# Patient Record
Sex: FEMALE | Race: WHITE | NOT HISPANIC OR LATINO | Employment: OTHER | URBAN - METROPOLITAN AREA
[De-identification: names, ages, dates, MRNs, and addresses within clinical notes are randomized per-mention and may not be internally consistent; named-entity substitution may affect disease eponyms.]

---

## 2013-09-09 LAB — HM COLONOSCOPY: NORMAL

## 2016-02-12 LAB — HM MAMMOGRAM: NORMAL

## 2017-02-13 ENCOUNTER — TELEPHONE (OUTPATIENT)
Dept: FAMILY MEDICINE CLINIC | Facility: CLINIC | Age: 52
End: 2017-02-13

## 2017-02-13 NOTE — TELEPHONE ENCOUNTER
RESUBMITTED PA FOR AMITIZA AND WILL AWAIT PA APPROVAL/DENIAL. PT IS AWARE OF THIS.     ----- Message from Juliet Chua sent at 2/10/2017  3:09 PM EST -----  NEEDS A PA ON AMITIZA   SAYS SHE HAS HAD IBS FOR 20 YEARS     PLEASE CALL PT WITH QUESTIONS AND IF THIS GETS APPROVED OR NOT     SHE STATES THIS WORKS MUCH BETTER THAN ЮЛИЯ     [phone]    INS   B

## 2017-02-14 ENCOUNTER — TELEPHONE (OUTPATIENT)
Dept: FAMILY MEDICINE CLINIC | Facility: CLINIC | Age: 52
End: 2017-02-14

## 2017-02-14 RX ORDER — TRIAMCINOLONE ACETONIDE 0.25 MG/G
CREAM TOPICAL 2 TIMES DAILY
Qty: 15 G | Refills: 1 | Status: SHIPPED | OUTPATIENT
Start: 2017-02-14 | End: 2017-03-19 | Stop reason: SDUPTHER

## 2017-02-14 NOTE — TELEPHONE ENCOUNTER
SENT RX TO PHARMACY FOR PT. CALLED AND LEFT MESSAGE FOR PT THAT I SENT RX FOR HER.     ----- Message from Davidson Buenrostro MD sent at 2/14/2017  8:05 AM EST -----  Contact: PATIENT [phone]  Kenalog cream, 30 or 45 g I don't know how it comes, use on eczema twice daily when necessary  ----- Message -----     From: Valery Ramos MA     Sent: 2/13/2017  11:03 AM       To: MD DR. JOVON Mckay,  PT SAID THAT IN THE PAST SHE HAS TAKEN A CREAM FOR ECZEMA AND SHE IS HAVING IT AGAIN AND WAS WONDERING IF YOU COULD CALL A CREAM IN FOR HER? PHARMACY: [facility]. PLEASE ADVISE. THANK YOU.

## 2017-02-28 ENCOUNTER — OFFICE VISIT (OUTPATIENT)
Dept: FAMILY MEDICINE CLINIC | Facility: CLINIC | Age: 52
End: 2017-02-28

## 2017-02-28 VITALS
SYSTOLIC BLOOD PRESSURE: 112 MMHG | TEMPERATURE: 97.9 F | WEIGHT: 164.6 LBS | HEIGHT: 65 IN | OXYGEN SATURATION: 97 % | BODY MASS INDEX: 27.42 KG/M2 | HEART RATE: 62 BPM | DIASTOLIC BLOOD PRESSURE: 68 MMHG

## 2017-02-28 DIAGNOSIS — F41.1 GENERALIZED ANXIETY DISORDER: Primary | ICD-10-CM

## 2017-02-28 DIAGNOSIS — E03.9 ACQUIRED HYPOTHYROIDISM: ICD-10-CM

## 2017-02-28 PROCEDURE — 99213 OFFICE O/P EST LOW 20 MIN: CPT | Performed by: INTERNAL MEDICINE

## 2017-02-28 RX ORDER — ESTRADIOL 0.07 MG/D
FILM, EXTENDED RELEASE TRANSDERMAL
Refills: 1 | COMMUNITY
Start: 2017-02-11 | End: 2020-09-04

## 2017-02-28 NOTE — PROGRESS NOTES
Subjective   Zee Reno is a 51 y.o. female. Patient is here today for issues that may well be some ADD.  She says her mind is just jumping from one thing to another she has difficulty completing tasks and really focusing.  She thinks she is having worsening the ADD that she had when she was younger.  Her son has some ADD as well.  She has not been evaluated at all recently.  Also she is hypothyroid on replacement has not been checked in about 4 months and is due for that.     Chief Complaint   Patient presents with   • Ear Fullness   • Advice Only     wants to talk about add or adhd medications          Vitals:    02/28/17 1502   BP: 112/68   Pulse: 62   Temp: 97.9 °F (36.6 °C)   SpO2: 97%     The following portions of the patient's history were reviewed and updated as appropriate: allergies, current medications, past family history, past medical history, past social history, past surgical history and problem list.    History reviewed. No pertinent past medical history.   Allergies   Allergen Reactions   • Codeine       Social History     Social History   • Marital status:      Spouse name: N/A   • Number of children: N/A   • Years of education: N/A     Occupational History   • Not on file.     Social History Main Topics   • Smoking status: Never Smoker   • Smokeless tobacco: Never Used   • Alcohol use Yes      Comment: SOCIAL   • Drug use: No   • Sexual activity: Yes     Partners: Male     Other Topics Concern   • Not on file     Social History Narrative   • No narrative on file        Current Outpatient Prescriptions:   •  acyclovir (ZOVIRAX) 400 MG tablet, TAKE 1 BY MOUTH TWICE A DAY, Disp: , Rfl: 3  •  citalopram (CeleXA) 40 MG tablet, Take 1 tablet by mouth Daily., Disp: 30 tablet, Rfl: 5  •  estradiol (MINIVELLE, VIVELLE-DOT) 0.075 MG/24HR patch, FRANCHESKA 1 PATCH TOPICALLY TWICE PER WEEK, Disp: , Rfl: 1  •  lubiprostone (AMITIZA) 24 MCG capsule, Take 1 capsule by mouth Daily., Disp: 30 capsule, Rfl: 3  •   SYNTHROID 150 MCG tablet, Take 1 tablet by mouth Daily., Disp: 90 tablet, Rfl: 3  •  triamcinolone (KENALOG) 0.025 % cream, Apply  topically 2 (Two) Times a Day., Disp: 15 g, Rfl: 1     Objective     History of Present Illness     Review of Systems   Constitutional: Negative.    HENT: Negative.    Eyes: Negative.    Respiratory: Negative.    Cardiovascular: Negative.    Gastrointestinal: Negative.    Genitourinary: Negative.    Musculoskeletal: Negative.    Neurological: Negative.    Psychiatric/Behavioral: Negative.        Physical Exam   Constitutional: She appears well-developed and well-nourished.   HENT:   Head: Normocephalic and atraumatic.   Eyes: Conjunctivae are normal.   Neck: Normal range of motion. Neck supple.   Cardiovascular: Normal rate, regular rhythm and normal heart sounds.    Pulmonary/Chest: Effort normal and breath sounds normal. No respiratory distress. She has no wheezes. She has no rales.   Musculoskeletal: Normal range of motion. She exhibits no edema.   Neurological: She is alert.   Skin: Skin is warm and dry.   Psychiatric: She has a normal mood and affect. Her behavior is normal.   Nursing note and vitals reviewed.      ASSESSMENT  overall the patient seems stable.  She does have symptoms compatible with ADD but I'm not competent to make that diagnosis.  She's also due for thyroid reevaluation.     Problem List Items Addressed This Visit        Endocrine    Hypothyroidism    Relevant Orders    T4, Free    TSH       Other    Generalized anxiety disorder - Primary    Relevant Orders    Ambulatory Referral to Psychology          PLAN  the patient will get thyroid studies drawn today.  I referred her for psychologic evaluation for possible ADD either through her insurance company or with Dr. Alaniz's group and can see her back once that's been done.      There are no Patient Instructions on file for this visit.  No Follow-up on file.

## 2017-03-01 LAB
T4 FREE SERPL-MCNC: 1.27 NG/DL (ref 0.93–1.7)
TSH SERPL DL<=0.005 MIU/L-ACNC: 0.11 MIU/ML (ref 0.27–4.2)

## 2017-03-20 RX ORDER — TRIAMCINOLONE ACETONIDE 0.25 MG/G
CREAM TOPICAL
Qty: 15 G | Refills: 0 | Status: SHIPPED | OUTPATIENT
Start: 2017-03-20 | End: 2021-03-03

## 2017-03-30 ENCOUNTER — APPOINTMENT (OUTPATIENT)
Dept: WOMENS IMAGING | Facility: HOSPITAL | Age: 52
End: 2017-03-30

## 2017-03-30 LAB — HM PAP SMEAR: NORMAL

## 2017-03-30 PROCEDURE — 77063 BREAST TOMOSYNTHESIS BI: CPT | Performed by: RADIOLOGY

## 2017-03-30 PROCEDURE — 77067 SCR MAMMO BI INCL CAD: CPT | Performed by: RADIOLOGY

## 2017-04-28 ENCOUNTER — OFFICE VISIT (OUTPATIENT)
Dept: FAMILY MEDICINE CLINIC | Facility: CLINIC | Age: 52
End: 2017-04-28

## 2017-04-28 VITALS
OXYGEN SATURATION: 99 % | DIASTOLIC BLOOD PRESSURE: 72 MMHG | HEART RATE: 78 BPM | WEIGHT: 160 LBS | HEIGHT: 65 IN | RESPIRATION RATE: 16 BRPM | TEMPERATURE: 97.5 F | SYSTOLIC BLOOD PRESSURE: 138 MMHG | BODY MASS INDEX: 26.66 KG/M2

## 2017-04-28 DIAGNOSIS — H69.93 EUSTACHIAN TUBE DISORDER, BILATERAL: Primary | ICD-10-CM

## 2017-04-28 PROCEDURE — 99213 OFFICE O/P EST LOW 20 MIN: CPT | Performed by: NURSE PRACTITIONER

## 2017-04-28 RX ORDER — FLUTICASONE PROPIONATE 50 MCG
2 SPRAY, SUSPENSION (ML) NASAL DAILY
Qty: 1 EACH | Refills: 1 | Status: SHIPPED | OUTPATIENT
Start: 2017-04-28 | End: 2017-05-28

## 2017-04-28 RX ORDER — ESTRADIOL 0.1 MG/D
FILM, EXTENDED RELEASE TRANSDERMAL
Refills: 12 | COMMUNITY
Start: 2017-03-30 | End: 2020-09-04

## 2017-04-28 NOTE — PROGRESS NOTES
Subjective   Zee Reno is a 51 y.o. female.   Chief Complaint   Patient presents with   • Ear Fullness     both ears, been going on for a month, but gotten worse in the last week      Vitals:    04/28/17 1436   BP: 138/72   Pulse: 78   Resp: 16   Temp: 97.5 °F (36.4 °C)   SpO2: 99%     No LMP recorded. Patient has had a hysterectomy.    History of Present Illness  Zee is here for an acute visit. She c/o bilateral ear pressure and fullness of a month but worsened over the past week. She feels like her ears are clogged. She has tried using baby oil in her ear with little relief. She denies pain, drainage, vertigo.     The following portions of the patient's history were reviewed and updated as appropriate: allergies, current medications, past family history, past medical history, past social history, past surgical history and problem list.    Review of Systems   Constitutional: Negative for chills, fatigue and fever.   HENT: Negative for congestion, ear discharge, ear pain and tinnitus.    Respiratory: Negative.    Cardiovascular: Negative.    Allergic/Immunologic: Negative for environmental allergies.       Objective   Physical Exam   Constitutional: Vital signs are normal. She appears well-developed and well-nourished. No distress.   HENT:   Right Ear: External ear and ear canal normal. Tympanic membrane is not erythematous. A middle ear effusion is present.   Left Ear: External ear and ear canal normal. Tympanic membrane is bulging. Tympanic membrane is not erythematous. A middle ear effusion is present.   Nose: Nose normal.   Mouth/Throat: Oropharynx is clear and moist.   Cardiovascular: Normal rate.    Pulmonary/Chest: Effort normal and breath sounds normal.   Neurological: She is alert.       Assessment/Plan   Zee was seen today for ear fullness.    Diagnoses and all orders for this visit:    Eustachian tube disorder, bilateral    Other orders  -     fluticasone (FLONASE) 50 MCG/ACT nasal spray; 2  sprays into each nostril Daily for 30 days.      Can use flonase or otc sudafed  I gave her a handout on ETD  Follow up if your symptoms persist, worsen, or if you develop new symptoms

## 2017-04-28 NOTE — PATIENT INSTRUCTIONS
Eustachian Tube Dysfunction  The eustachian tube connects the middle ear to the back of the nose. It regulates air pressure in the middle ear by allowing air to move between the ear and nose. It also helps to drain fluid from the middle ear space. When the eustachian tube does not function properly, air pressure, fluid, or both can build up in the middle ear.  Eustachian tube dysfunction can affect one or both ears.  CAUSES  This condition happens when the eustachian tube becomes blocked or cannot open normally. This may result from:  · Ear infections.  · Colds and other upper respiratory infections.  · Allergies.  · Irritation, such as from cigarette smoke or acid from the stomach coming up into the esophagus (gastroesophageal reflux).  · Sudden changes in air pressure, such as from descending in an airplane.  · Abnormal growths in the nose or throat, such as nasal polyps, tumors, or enlarged tissue at the back of the throat (adenoids).  RISK FACTORS  This condition may be more likely to develop in people who smoke and people who are overweight. Eustachian tube dysfunction may also be more likely to develop in children, especially children who have:  · Certain birth defects of the mouth, such as cleft palate.  · Large tonsils and adenoids.  SYMPTOMS  Symptoms of this condition may include:  · A feeling of fullness in the ear.  · Ear pain.  · Clicking or popping noises in the ear.  · Ringing in the ear.  · Hearing loss.  · Loss of balance.  Symptoms may get worse when the air pressure around you changes, such as when you travel to an area of high elevation or fly on an airplane.   DIAGNOSIS  This condition may be diagnosed based on:  · Your symptoms.  · A physical exam of your ear, nose, and throat.  · Tests, such as those that measure:    The movement of your eardrum (tympanogram).    Your hearing (audiometry).  TREATMENT  Treatment depends on the cause and severity of your condition. If your symptoms are mild, you  "may be able to relieve your symptoms by moving air into (\"popping\") your ears. If you have symptoms of fluid in your ears, treatment may include:  · Decongestants.  · Antihistamines.  · Nasal sprays or ear drops that contain medicines that reduce swelling (steroids).  In some cases, you may need to have a procedure to drain the fluid in your eardrum (myringotomy). In this procedure, a small tube is placed in the eardrum to:  · Drain the fluid.  · Restore the air in the middle ear space.  HOME CARE INSTRUCTIONS  · Take over-the-counter and prescription medicines only as told by your health care provider.  · Use techniques to help pop your ears as recommended by your health care provider. These may include:    Chewing gum.    Yawning.    Frequent, forceful swallowing.    Closing your mouth, holding your nose closed, and gently blowing as if you are trying to blow air out of your nose.  · Do not do any of the following until your health care provider approves:    Travel to high altitudes.    Fly in airplanes.    Work in a pressurized cabin or room.    Scuba dive.  · Keep your ears dry. Dry your ears completely after showering or bathing.  · Do not smoke.  · Keep all follow-up visits as told by your health care provider. This is important.  SEEK MEDICAL CARE IF:  · Your symptoms do not go away after treatment.  · Your symptoms come back after treatment.  · You are unable to pop your ears.  · You have:    A fever.    Pain in your ear.    Pain in your head or neck.    Fluid draining from your ear.  · Your hearing suddenly changes.  · You become very dizzy.  · You lose your balance.     This information is not intended to replace advice given to you by your health care provider. Make sure you discuss any questions you have with your health care provider.     Document Released: 01/13/2017 Document Reviewed: 01/06/2016  Elsevier Interactive Patient Education ©2016 [employer]    "

## 2017-05-01 DIAGNOSIS — Z00.00 ROUTINE HEALTH MAINTENANCE: Primary | ICD-10-CM

## 2017-05-01 DIAGNOSIS — E03.9 HYPOTHYROIDISM, UNSPECIFIED TYPE: ICD-10-CM

## 2017-06-12 RX ORDER — CITALOPRAM 40 MG/1
TABLET ORAL
Qty: 30 TABLET | Refills: 0 | Status: SHIPPED | OUTPATIENT
Start: 2017-06-12 | End: 2017-06-27 | Stop reason: SDUPTHER

## 2017-06-20 ENCOUNTER — TELEPHONE (OUTPATIENT)
Dept: FAMILY MEDICINE CLINIC | Facility: CLINIC | Age: 52
End: 2017-06-20

## 2017-06-20 RX ORDER — CITALOPRAM 20 MG/1
TABLET ORAL
Qty: 90 TABLET | Refills: 0 | Status: SHIPPED | OUTPATIENT
Start: 2017-06-20 | End: 2017-06-22 | Stop reason: SDUPTHER

## 2017-06-20 NOTE — TELEPHONE ENCOUNTER
SENT RX'S TO PHARMACY FOR PT. SHE IS AWARE. PT STATES THAT SHE WILL PROBABLY KEEP DR. SIGALA AS HER DOCTOR AND COME BACK HERE FOR FOLLOW UPS AND LABS BUT SHE WILL LET US KNOW.     ----- Message from Davidson Hutchison MD sent at 6/20/2017  8:06 AM EDT -----  It as it is okay to do this        ----- Message -----     From: Valery Ramos MA     Sent: 6/20/2017   7:11 AM       To: MD DR. JOVON Mckay,  PLEASE SEE BELOW, JUST WANTED TO MAKE SURE THIS IS OKAY. THANK YOU.     ----- Message -----     From: Mee Hudson     Sent: 6/19/2017   1:47 PM       To: Valery Ramos MA    PT SAID DR HUTCHISON WOULD SEND A 6 MONTH REFILL ON SYNTHROID AND CELEXA UNTIL SHE FOULD A DR WHERE SHE MOVED TO.    PHARMACY [address]       [phone]      PT # [phone] PLEASE CALL HER BACK IF YOU HAVE QUESTIONS

## 2017-06-22 RX ORDER — CITALOPRAM 20 MG/1
TABLET ORAL
Qty: 90 TABLET | Refills: 0 | Status: SHIPPED | OUTPATIENT
Start: 2017-06-22 | End: 2017-06-27

## 2017-06-27 RX ORDER — CITALOPRAM 40 MG/1
40 TABLET ORAL DAILY
Qty: 90 TABLET | Refills: 1 | Status: SHIPPED | OUTPATIENT
Start: 2017-06-27 | End: 2017-11-29 | Stop reason: SDUPTHER

## 2017-06-27 RX ORDER — LEVOTHYROXINE SODIUM 150 MCG
150 TABLET ORAL DAILY
Qty: 90 TABLET | Refills: 1 | Status: SHIPPED | OUTPATIENT
Start: 2017-06-27 | End: 2017-11-29 | Stop reason: SDUPTHER

## 2017-09-20 DIAGNOSIS — F41.1 GENERALIZED ANXIETY DISORDER: ICD-10-CM

## 2017-09-20 DIAGNOSIS — E03.9 ACQUIRED HYPOTHYROIDISM: ICD-10-CM

## 2017-09-20 DIAGNOSIS — Z13.220 SCREENING FOR CHOLESTEROL LEVEL: Primary | ICD-10-CM

## 2017-11-29 ENCOUNTER — TELEPHONE (OUTPATIENT)
Dept: FAMILY MEDICINE CLINIC | Facility: CLINIC | Age: 52
End: 2017-11-29

## 2017-11-29 RX ORDER — LEVOTHYROXINE SODIUM 150 MCG
150 TABLET ORAL DAILY
Qty: 90 TABLET | Refills: 0 | Status: SHIPPED | OUTPATIENT
Start: 2017-11-29 | End: 2020-09-04 | Stop reason: SDUPTHER

## 2017-11-29 RX ORDER — CITALOPRAM 40 MG/1
40 TABLET ORAL DAILY
Qty: 90 TABLET | Refills: 0 | Status: SHIPPED | OUTPATIENT
Start: 2017-11-29 | End: 2020-09-04 | Stop reason: SDUPTHER

## 2017-11-29 NOTE — TELEPHONE ENCOUNTER
SENT RX'S TO PHARMACY FOR PT FOR A 90 DAY SUPPLY. CALLED AND LEFT MESSAGE FOR PT ADVISING HER THAT I SENT THEM IN AND THAT SHE ALSO IS DUE FOR AN APPT WITH LABS A WEEK PRIOR TO NEXT REFILL.       ----- Message from Amie Barlow MA sent at 11/28/2017  2:59 PM EST -----  Contact: PT  PT NEEDS RX REFILL FOR SYNTHROID 150 MCG QTY 90 AND CELEXA 40 MG QTY 90 PT USES [facility] [address] [place] [address] [id number] AT [facility] - [phone]  - [phone] FX        PT CAN BE REACHED [phone]

## 2018-02-19 RX ORDER — CITALOPRAM 40 MG/1
TABLET ORAL
Qty: 30 TABLET | Refills: 0 | Status: SHIPPED | OUTPATIENT
Start: 2018-02-19 | End: 2020-09-04

## 2018-05-14 RX ORDER — LEVOTHYROXINE SODIUM 0.15 MG/1
TABLET ORAL
Qty: 130 TABLET | Refills: 0 | OUTPATIENT
Start: 2018-05-14

## 2018-05-16 RX ORDER — TRIAMCINOLONE ACETONIDE 0.25 MG/G
CREAM TOPICAL
Qty: 15 G | Refills: 0 | OUTPATIENT
Start: 2018-05-16

## 2020-01-12 ENCOUNTER — HOSPITAL ENCOUNTER (EMERGENCY)
Dept: HOSPITAL 62 - ER | Age: 55
Discharge: HOME | End: 2020-01-12
Payer: COMMERCIAL

## 2020-01-12 VITALS — SYSTOLIC BLOOD PRESSURE: 112 MMHG | DIASTOLIC BLOOD PRESSURE: 83 MMHG

## 2020-01-12 DIAGNOSIS — M54.9: ICD-10-CM

## 2020-01-12 DIAGNOSIS — R10.11: ICD-10-CM

## 2020-01-12 DIAGNOSIS — K21.9: Primary | ICD-10-CM

## 2020-01-12 DIAGNOSIS — R07.89: ICD-10-CM

## 2020-01-12 LAB
ADD MANUAL DIFF: NO
ALBUMIN SERPL-MCNC: 4.6 G/DL (ref 3.5–5)
ALP SERPL-CCNC: 56 U/L (ref 38–126)
ANION GAP SERPL CALC-SCNC: 8 MMOL/L (ref 5–19)
AST SERPL-CCNC: 25 U/L (ref 14–36)
BASOPHILS # BLD AUTO: 0 10^3/UL (ref 0–0.2)
BASOPHILS NFR BLD AUTO: 0.4 % (ref 0–2)
BILIRUB DIRECT SERPL-MCNC: 0.1 MG/DL (ref 0–0.4)
BILIRUB SERPL-MCNC: 0.8 MG/DL (ref 0.2–1.3)
BUN SERPL-MCNC: 9 MG/DL (ref 7–20)
CALCIUM: 9.7 MG/DL (ref 8.4–10.2)
CHLORIDE SERPL-SCNC: 106 MMOL/L (ref 98–107)
CO2 SERPL-SCNC: 26 MMOL/L (ref 22–30)
EOSINOPHIL # BLD AUTO: 0.1 10^3/UL (ref 0–0.6)
EOSINOPHIL NFR BLD AUTO: 3.9 % (ref 0–6)
ERYTHROCYTE [DISTWIDTH] IN BLOOD BY AUTOMATED COUNT: 12.6 % (ref 11.5–14)
GLUCOSE SERPL-MCNC: 101 MG/DL (ref 75–110)
HCT VFR BLD CALC: 40.3 % (ref 36–47)
HGB BLD-MCNC: 14.2 G/DL (ref 12–15.5)
LYMPHOCYTES # BLD AUTO: 1.2 10^3/UL (ref 0.5–4.7)
LYMPHOCYTES NFR BLD AUTO: 40.6 % (ref 13–45)
MCH RBC QN AUTO: 31.5 PG (ref 27–33.4)
MCHC RBC AUTO-ENTMCNC: 35.2 G/DL (ref 32–36)
MCV RBC AUTO: 90 FL (ref 80–97)
MONOCYTES # BLD AUTO: 0.3 10^3/UL (ref 0.1–1.4)
MONOCYTES NFR BLD AUTO: 10.4 % (ref 3–13)
NEUTROPHILS # BLD AUTO: 1.4 10^3/UL (ref 1.7–8.2)
NEUTS SEG NFR BLD AUTO: 44.7 % (ref 42–78)
PLATELET # BLD: 230 10^3/UL (ref 150–450)
POTASSIUM SERPL-SCNC: 4.3 MMOL/L (ref 3.6–5)
PROT SERPL-MCNC: 7.7 G/DL (ref 6.3–8.2)
RBC # BLD AUTO: 4.51 10^6/UL (ref 3.72–5.28)
TOTAL CELLS COUNTED % (AUTO): 100 %
WBC # BLD AUTO: 3.1 10^3/UL (ref 4–10.5)

## 2020-01-12 PROCEDURE — 80053 COMPREHEN METABOLIC PANEL: CPT

## 2020-01-12 PROCEDURE — 36415 COLL VENOUS BLD VENIPUNCTURE: CPT

## 2020-01-12 PROCEDURE — 93005 ELECTROCARDIOGRAM TRACING: CPT

## 2020-01-12 PROCEDURE — 99285 EMERGENCY DEPT VISIT HI MDM: CPT

## 2020-01-12 PROCEDURE — 71046 X-RAY EXAM CHEST 2 VIEWS: CPT

## 2020-01-12 PROCEDURE — S0119 ONDANSETRON 4 MG: HCPCS

## 2020-01-12 PROCEDURE — 93010 ELECTROCARDIOGRAM REPORT: CPT

## 2020-01-12 PROCEDURE — 84484 ASSAY OF TROPONIN QUANT: CPT

## 2020-01-12 PROCEDURE — 83690 ASSAY OF LIPASE: CPT

## 2020-01-12 PROCEDURE — 76705 ECHO EXAM OF ABDOMEN: CPT

## 2020-01-12 PROCEDURE — 85025 COMPLETE CBC W/AUTO DIFF WBC: CPT

## 2020-01-12 NOTE — RADIOLOGY REPORT (SQ)
EXAM DESCRIPTION:  CHEST 2 VIEWS



COMPLETED DATE/TIME:  1/12/2020 10:29 am



REASON FOR STUDY:  chest pain



COMPARISON:  None.



TECHNIQUE:  Frontal and lateral radiographic views of the chest acquired.



NUMBER OF VIEWS:  Two view.



LIMITATIONS:  None.



FINDINGS:  LUNGS AND PLEURA: No opacities, masses or pneumothorax. No pleural effusion.

MEDIASTINUM AND HILAR STRUCTURES: No masses or contour abnormalities.

HEART AND VASCULAR STRUCTURES: Heart normal size.  No evidence for failure.

BONES: No acute findings.

HARDWARE: None in the chest.

OTHER: No other significant finding.



IMPRESSION:  NO SIGNIFICANT RADIOGRAPHIC FINDING IN THE CHEST.



TECHNICAL DOCUMENTATION:  JOB ID:  [id number]

 2011 [employer]- All Rights Reserved



Reading location - IP/workstation name: [email or web address]

## 2020-01-12 NOTE — ER DOCUMENT REPORT
ED Medical Screen (RME)





- General


Chief Complaint: Back Pain


Stated Complaint: CHEST PAIN


Time Seen by Provider: 01/12/20 10:07


Mode of Arrival: Ambulatory


Information source: Patient


Notes: 





54-year-old female presented to the ED with complaints of back pain that 

radiates straight through to her chest as well as epigastric pain with nausea.  

Patient reports the epigastric pain with nausea has been going on for quite some

time however the sharp stabbing pain in her back just started prior to arrival. 

She states that she felt like she might be having a heart attack or gallbladder 

issue.





Exam:


Tenderness in the epigastric region.





I have greeted and performed a rapid initial assessment of this patient.  A 

comprehensive ED assessment and evaluation of the patient, analysis of test resu

lts and completion of the medical decision making process will be conducted by 

additional ED providers.  I have specifically instructed the patient or family 

members with the patient to immediately return to any nursing staff should 

anything change in the patient's condition or with their chief complaint.





TRAVEL OUTSIDE OF THE [place] IN LAST 30 DAYS: No





- Related Data


Allergies/Adverse Reactions: 


                                        





No Known Allergies Allergy (Unverified 01/12/20 10:06)


   








Home Medications: Synthroid.  Celexa.  Cytomel.  Lunesta.  Estrogen pellets





Past Medical History





- Social History


Chew tobacco use (# tins/day): No


Frequency of alcohol use: Rare


Drug Abuse: None





Physical Exam





- Vital signs


Vitals: 





                                        











Temp Pulse Resp BP Pulse Ox


 


 97.8 F   80   20   116/57 L  97 


 


 01/12/20 09:46  01/12/20 09:46  01/12/20 09:46  01/12/20 09:46  01/12/20 09:46














Course





- Vital Signs


Vital signs: 





                                        











Temp Pulse Resp BP Pulse Ox


 


 97.8 F   80   20   116/57 L  97 


 


 01/12/20 09:46  01/12/20 09:46  01/12/20 09:46  01/12/20 09:46  01/12/20 09:46

## 2020-01-12 NOTE — ER DOCUMENT REPORT
Entered by ANNETTE MANZANARES SCRIBE  01/12/20 1117 





Acting as scribe for:ANGE MARQUEZ MD





ED General





- General


Chief Complaint: Back Pain


Stated Complaint: CHEST PAIN


Time Seen by Provider: 01/12/20 10:07


Mode of Arrival: Ambulatory


Information source: Patient


Notes: 





This 54 year old female patient presents to the emergency department today for 

complaints of a stabbing pain in her upper back and left chest just prior to 

arrival. Patient states she was here visiting her son and she was getting ready 

to drive back to [place] when the symptoms began. Patient reports that her chest

pain is reproducible with movement of her left upper extremity and with 

breathing. Patient states she feels like her chest is bruised. 





Patient continues on with multiple chronic complaints including not being able 

to eat any solid food for the last x2 years, stating she vomits it back up. 

Patient has been followed by a GI doctor for this although it seems she does not

follow any of their advice as her "father-in-law has had similar symptoms and he

knows what to do". 


TRAVEL OUTSIDE OF THE [place] IN LAST 30 DAYS: No





- Related Data


Allergies/Adverse Reactions: 


                                        





No Known Allergies Allergy (Unverified 01/12/20 10:06)


   








Home Medications: Synthroid.  Celexa.  Cytomel.  Lunesta.  Estrogen pellets





Past Medical History





- General


Information source: Patient





- Social History


Smoking Status: Never Smoker


Cigarette use (# per day): No


Chew tobacco use (# tins/day): No


Frequency of alcohol use: Rare


Drug Abuse: None


Lives with: Family


Family History: Reviewed & Not Pertinent


Patient has suicidal ideation: No


Patient has homicidal ideation: No


GI Medical History: Reports: Hx Gastroesophageal Reflux Disease, Hx Hiatal 

Hernia





Review of Systems





- Review of Systems


Constitutional: No symptoms reported


EENT: No symptoms reported


Cardiovascular: See HPI, Chest pain


Respiratory: See HPI, Hurts to breathe


Gastrointestinal: No symptoms reported


Genitourinary: No symptoms reported


Female Genitourinary: No symptoms reported


Musculoskeletal: See HPI, Other - LUE extremity pain


Skin: No symptoms reported


Hematologic/Lymphatic: Easy bruising


Neurological/Psychological: No symptoms reported


-: Yes All other systems reviewed and negative





Physical Exam





- Vital signs


Vitals: 


                                        











Temp Pulse Resp BP Pulse Ox


 


 97.8 F   80   20   116/57 L  97 


 


 01/12/20 09:46  01/12/20 09:46  01/12/20 09:46  01/12/20 09:46  01/12/20 09:46











Interpretation: Normal





- General


General appearance: Appears well, Alert





- HEENT


Head: Normocephalic, Atraumatic


Eyes: Normal


Pupils: PERRL





- Respiratory


Respiratory status: No respiratory distress


Chest status: Nontender


Breath sounds: Normal


Chest palpation: Normal





- Cardiovascular


Rhythm: Regular


Heart sounds: Normal auscultation


Murmur: No





- Abdominal


Distension: No distension


Bowel sounds: Normal


Tenderness: Tender - epigastric tenderness with palpation


Organomegaly: No organomegaly





- Back


Back: Normal, Nontender





- Extremities


General upper extremity: Other - bruising over the medial volar wrists 

bilaterally


General lower extremity: Edema - minimal bilateral trace pre-tibial edema 

bilaterally





- Neurological


Neuro grossly intact: Yes


Cognition: Normal


Orientation: AAOx4


Chichester Coma Scale Eye Opening: Spontaneous


Molly Coma Scale Verbal: Oriented


Molly Coma Scale Motor: Obeys Commands


Chichester Coma Scale Total: 15


Speech: Normal


Motor strength normal: LUE, RUE, LLE, RLE


Sensory: Normal





- Psychological


Associated symptoms: Other - Rapid speech, frequently changes subjects





- Skin


Skin Temperature: Warm


Skin Moisture: Dry


Skin Color: Normal





Course





- Re-evaluation


Re-evalutation: 





01/12/20 12:30


Patient reports the epigastric discomfort did improve after the GI cocktail.





- Vital Signs


Vital signs: 


                                        











Temp Pulse Resp BP Pulse Ox


 


 97.7 F   67   14   112/83   100 


 


 01/12/20 12:55  01/12/20 12:55  01/12/20 12:55  01/12/20 12:55  01/12/20 12:55














- Laboratory


Result Diagrams: 


                                 01/12/20 10:21





                                 01/12/20 10:21


Laboratory results interpreted by me: 


                                        











  01/12/20





  10:21


 


WBC  3.1 L


 


Absolute Neuts (auto)  1.4 L














- Diagnostic Test


Radiology reviewed: Image reviewed, Reports reviewed - Right upper quadrant 

abdominal ultrasound is unremarkable.  Chest x-ray is unremarkable.





- EKG Interpretation by Me


EKG shows normal: Sinus rhythm, Axis, Intervals, QRS Complexes.  abnormal: ST-T 

Waves - Borderline anterior lateral T abnormalities


Rate: Normal - 72


Rhythm: NSR


When compared to previous EKG there are: Previous EKG unavailable





Discharge





- Discharge


Clinical Impression: 


 Chest wall pain





GERD (gastroesophageal reflux disease)


Qualifiers:


 Esophagitis presence: esophagitis presence not specified Qualified Code(s): 

K21.9 - Gastro-esophageal reflux disease without esophagitis





Condition: Stable


Disposition: HOME, SELF-CARE


Additional Instructions: 


Chest Wall Pain:





   Your chest pain has been diagnosed as coming from the chest wall.  This is 

often caused by straining the muscles or joints in the chest during physical 

activity, direct trauma, coughing, or vigorous vomiting.  Persons with arthritis

are especially prone to this type of pain, due to inflammation of the cartilage 

joints near the breast bone.  Occasionally, no cause can be found.


   Rest from strenuous physical activity.  This kind of chest pain is usually 

made worse by movement of the chest.  Depending on the symptoms, we may 

prescribe medicine for pain, muscle relaxation, and antiinflammatory effects.


   If the pain is new, and seems to be due to muscle strain, cold packs can 

help. Otherwise, apply gentle warmth to the painful area for 15 minutes every 

hour or two.   


   You should contact the doctor immediately if things change.  Further 

evaluation is needed if you develop a fever or cough, if the nature of the pain 

changes, or if you become short of breath.





Reflux Disease (GERD):





     Gastro-Esophageal Reflux Disease (GERD) is caused by stomach acid refluxing

back up into the esophagus. The valve at the end of the esophagus may be weak. 

This is common in persons with a hiatal hernia. GERD symptoms can include 

indigestion, chest pain, heartburn, or food "sticking." Certain foods, alcohol, 

and aspirin can make GERD worse.


     Treatment depends on the severity. Usually, antacids or acid-suppressing 

medicines are used. When the esophagus is acutely inflamed, the physician will 

often prescribe membrane-protective drugs such as Carafate.  Some patients 

benefit from medication such as Reglan that tightens the valve at the top of the

stomach.


     Avoid those foods that bring on your symptoms. For many people, these foods

are coffee, chocolate, onions, garlic, and carbonated drinks. Don't use alcohol,

aspirin, caffeine, or tobacco. Don't eat late at night -- within 4 hours of 

bedtime. Don't over-eat. If necessary, elevate the head of your bed about 4 

inches so that stomach acid will not roll up into your esophagus.


     Call the doctor if you develop severe chest pain, inability to swallow 

fluids, fever, or worsening symptoms.








*******************************************************

**************************************





Take the proton pump inhibitor that you were prescribed in the past.


Take antacids between meals and at bedtime.


Follow-up with your primary care provider for further evaluation and or referral

for your swallowing difficulties.





RETURN TO THE EMERGENCY ROOM IF ANY NEW OR WORSENING SYMPTOMS.








Tammyibe Attestation: 





01/12/20 11:36


I personally performed the services described in the documentation, reviewed and

edited the documentation which was dictated to the scribe in my presence, and it

accurately records my words and actions.





I personally performed the services described in the documentation, reviewed and

edited the documentation which was dictated to the scribe in my presence, and it

accurately records my words and actions.

## 2020-01-12 NOTE — EKG REPORT
SEVERITY:- BORDERLINE ECG -

SINUS RHYTHM

BORDERLINE T ABNORMALITIES, ANT-LAT LEADS

:

Confirmed by: Fritz Ocasio 12-Jan-2020 17:34:48

## 2020-08-07 DIAGNOSIS — Z11.59 NEED FOR HEPATITIS C SCREENING TEST: ICD-10-CM

## 2020-08-07 DIAGNOSIS — Z13.220 SCREENING FOR CHOLESTEROL LEVEL: ICD-10-CM

## 2020-08-07 DIAGNOSIS — E03.9 ACQUIRED HYPOTHYROIDISM: ICD-10-CM

## 2020-08-13 ENCOUNTER — RESULTS ENCOUNTER (OUTPATIENT)
Dept: FAMILY MEDICINE CLINIC | Facility: CLINIC | Age: 55
End: 2020-08-13

## 2020-08-13 DIAGNOSIS — E03.9 ACQUIRED HYPOTHYROIDISM: ICD-10-CM

## 2020-08-13 DIAGNOSIS — Z13.220 SCREENING FOR CHOLESTEROL LEVEL: ICD-10-CM

## 2020-08-13 DIAGNOSIS — Z11.59 NEED FOR HEPATITIS C SCREENING TEST: ICD-10-CM

## 2020-08-28 LAB
ALBUMIN SERPL-MCNC: 4.8 G/DL (ref 3.5–5.2)
ALBUMIN/GLOB SERPL: 2.3 G/DL
ALP SERPL-CCNC: 75 U/L (ref 39–117)
ALT SERPL-CCNC: 43 U/L (ref 1–33)
AST SERPL-CCNC: 25 U/L (ref 1–32)
BASOPHILS # BLD AUTO: 0.02 10*3/MM3 (ref 0–0.2)
BASOPHILS NFR BLD AUTO: 0.4 % (ref 0–1.5)
BILIRUB SERPL-MCNC: 0.6 MG/DL (ref 0–1.2)
BUN SERPL-MCNC: 10 MG/DL (ref 6–20)
BUN/CREAT SERPL: 11.9 (ref 7–25)
CALCIUM SERPL-MCNC: 10 MG/DL (ref 8.6–10.5)
CHLORIDE SERPL-SCNC: 103 MMOL/L (ref 98–107)
CHOLEST SERPL-MCNC: 221 MG/DL (ref 0–200)
CO2 SERPL-SCNC: 27.3 MMOL/L (ref 22–29)
CREAT SERPL-MCNC: 0.84 MG/DL (ref 0.57–1)
EOSINOPHIL # BLD AUTO: 0.14 10*3/MM3 (ref 0–0.4)
EOSINOPHIL NFR BLD AUTO: 3.1 % (ref 0.3–6.2)
ERYTHROCYTE [DISTWIDTH] IN BLOOD BY AUTOMATED COUNT: 12 % (ref 12.3–15.4)
GLOBULIN SER CALC-MCNC: 2.1 GM/DL
GLUCOSE SERPL-MCNC: 95 MG/DL (ref 65–99)
HCT VFR BLD AUTO: 41.4 % (ref 34–46.6)
HCV AB S/CO SERPL IA: <0.1 S/CO RATIO (ref 0–0.9)
HCV AB SERPL QL IA: NORMAL
HDLC SERPL-MCNC: 47 MG/DL (ref 40–60)
HGB BLD-MCNC: 14.1 G/DL (ref 12–15.9)
IMM GRANULOCYTES # BLD AUTO: 0.01 10*3/MM3 (ref 0–0.05)
IMM GRANULOCYTES NFR BLD AUTO: 0.2 % (ref 0–0.5)
LDLC SERPL CALC-MCNC: 138 MG/DL (ref 0–100)
LDLC/HDLC SERPL: 2.93 {RATIO}
LYMPHOCYTES # BLD AUTO: 1.73 10*3/MM3 (ref 0.7–3.1)
LYMPHOCYTES NFR BLD AUTO: 37.9 % (ref 19.6–45.3)
MCH RBC QN AUTO: 30.9 PG (ref 26.6–33)
MCHC RBC AUTO-ENTMCNC: 34.1 G/DL (ref 31.5–35.7)
MCV RBC AUTO: 90.6 FL (ref 79–97)
MONOCYTES # BLD AUTO: 0.4 10*3/MM3 (ref 0.1–0.9)
MONOCYTES NFR BLD AUTO: 8.8 % (ref 5–12)
NEUTROPHILS # BLD AUTO: 2.26 10*3/MM3 (ref 1.7–7)
NEUTROPHILS NFR BLD AUTO: 49.6 % (ref 42.7–76)
NRBC BLD AUTO-RTO: 0 /100 WBC (ref 0–0.2)
PLATELET # BLD AUTO: 258 10*3/MM3 (ref 140–450)
POTASSIUM SERPL-SCNC: 4.4 MMOL/L (ref 3.5–5.2)
PROT SERPL-MCNC: 6.9 G/DL (ref 6–8.5)
RBC # BLD AUTO: 4.57 10*6/MM3 (ref 3.77–5.28)
SODIUM SERPL-SCNC: 139 MMOL/L (ref 136–145)
T4 FREE SERPL-MCNC: 1.67 NG/DL (ref 0.93–1.7)
TRIGL SERPL-MCNC: 182 MG/DL (ref 0–150)
TSH SERPL DL<=0.005 MIU/L-ACNC: 0.01 UIU/ML (ref 0.27–4.2)
VLDLC SERPL CALC-MCNC: 36.4 MG/DL
WBC # BLD AUTO: 4.56 10*3/MM3 (ref 3.4–10.8)

## 2020-09-04 ENCOUNTER — OFFICE VISIT (OUTPATIENT)
Dept: FAMILY MEDICINE CLINIC | Facility: CLINIC | Age: 55
End: 2020-09-04

## 2020-09-04 VITALS
RESPIRATION RATE: 16 BRPM | WEIGHT: 173.2 LBS | SYSTOLIC BLOOD PRESSURE: 115 MMHG | HEIGHT: 64 IN | BODY MASS INDEX: 29.57 KG/M2 | OXYGEN SATURATION: 99 % | TEMPERATURE: 98.2 F | DIASTOLIC BLOOD PRESSURE: 74 MMHG | HEART RATE: 75 BPM

## 2020-09-04 DIAGNOSIS — F41.1 GENERALIZED ANXIETY DISORDER: ICD-10-CM

## 2020-09-04 DIAGNOSIS — F51.01 PRIMARY INSOMNIA: ICD-10-CM

## 2020-09-04 DIAGNOSIS — E03.9 ACQUIRED HYPOTHYROIDISM: Primary | ICD-10-CM

## 2020-09-04 DIAGNOSIS — E78.5 HYPERLIPIDEMIA, UNSPECIFIED HYPERLIPIDEMIA TYPE: ICD-10-CM

## 2020-09-04 DIAGNOSIS — F41.9 ANXIETY: ICD-10-CM

## 2020-09-04 DIAGNOSIS — Z23 NEED FOR VACCINATION: ICD-10-CM

## 2020-09-04 PROCEDURE — 90471 IMMUNIZATION ADMIN: CPT | Performed by: INTERNAL MEDICINE

## 2020-09-04 PROCEDURE — 90715 TDAP VACCINE 7 YRS/> IM: CPT | Performed by: INTERNAL MEDICINE

## 2020-09-04 PROCEDURE — 99214 OFFICE O/P EST MOD 30 MIN: CPT | Performed by: INTERNAL MEDICINE

## 2020-09-04 RX ORDER — ROSUVASTATIN CALCIUM 5 MG/1
5 TABLET, COATED ORAL DAILY
Qty: 90 TABLET | Refills: 3 | Status: SHIPPED | OUTPATIENT
Start: 2020-09-04 | End: 2021-06-18 | Stop reason: SDUPTHER

## 2020-09-04 RX ORDER — ESZOPICLONE 3 MG/1
3 TABLET, FILM COATED ORAL NIGHTLY
Qty: 30 TABLET | Refills: 3 | Status: SHIPPED | OUTPATIENT
Start: 2020-09-04 | End: 2021-01-27

## 2020-09-04 RX ORDER — DOXYCYCLINE HYCLATE 100 MG
100 TABLET ORAL 2 TIMES DAILY
COMMUNITY
End: 2021-03-03

## 2020-09-04 RX ORDER — CITALOPRAM 20 MG/1
20 TABLET ORAL DAILY
Qty: 90 TABLET | Refills: 3 | Status: SHIPPED | OUTPATIENT
Start: 2020-09-04 | End: 2021-07-29 | Stop reason: SDUPTHER

## 2020-09-04 RX ORDER — LEVOTHYROXINE SODIUM 150 MCG
150 TABLET ORAL DAILY
Qty: 90 TABLET | Refills: 3 | Status: SHIPPED | OUTPATIENT
Start: 2020-09-04 | End: 2021-10-07 | Stop reason: SDUPTHER

## 2020-09-04 NOTE — PROGRESS NOTES
Javier Reno is a 55 y.o. female. Patient is here today for establishing care again.  She was last seen by me over 3 years ago, had moved to [place] and now is back.  She is on a new sleeping pill and continues on thyroid medication.  She will be establishing with gynecology but is up-to-date there.  She has had recent laboratory studies to review.  She generally is feeling well.  Chief Complaint   Patient presents with   • Hypothyroidism     F/U on labs          Vitals:    09/04/20 1359   BP: 115/74   Pulse: 75   Resp: 16   Temp: 98.2 °F (36.8 °C)   SpO2: 99%     Body mass index is 29.73 kg/m².  The following portions of the patient's history were reviewed and updated as appropriate: allergies, current medications, past family history, past medical history, past social history, past surgical history and problem list.    Past Medical History:   Diagnosis Date   • Abdominal pain 07/08/2002    NEGATIVE ABDOMINAL SERIES, SEEN AT [facility] ER   • Adenomyosis    • Adjustment disorder with mixed anxiety and depressed mood    • Allergic rhinitis    • Anxiety    • Colon polyps    • Depression    • Dermatitis 12/10/2014   • Disease of thyroid gland     HYPOTHYROIDISM   • Dyspareunia, female    • Erosive esophagitis 01/16/2003    DR. DEBBIE RENTERIAD   • Eustachian tube disorder     BILATERAL   • Gastritis 01/16/2003    MILD, DR. LEWIS EGSABA   • Generalized anxiety disorder    • Genital herpes simplex    • History of menorrhagia    • Irritable bowel syndrome with constipation    • Left leg pain 11/08/2007    MRI OF LUMBAR SPINE AT [facility] WAS WNL   • Melanosis coli 01/16/2003    DR. CLAUDINE LEWIS CY   • Menopausal state    • Migraine    • Ovarian cyst 03/07/2002    CT SCAN AT [facility] FOR 3CM LEFT OVARIAN CYST   • Right sided abdominal pain 04/02/2002    CT SCAN SHOWED TRACE AMT OF FREE FLUID IN RIGHT HEMIPELVIS, UTERUS AND ADNEXA APPEAR UNREMARKABLE, TINY FOLLICLES WITHIN THE RIGHT OVARY   • Sliding hiatal hernia 01/16/2003     EGD        Allergies   Allergen Reactions   • Codeine    • Keflex [Cephalexin]       Social History     Socioeconomic History   • Marital status:      Spouse name: Not on file   • Number of children: Not on file   • Years of education: Not on file   • Highest education level: Not on file   Tobacco Use   • Smoking status: Never Smoker   • Smokeless tobacco: Never Used   Substance and Sexual Activity   • Alcohol use: Yes     Comment: SOCIAL   • Drug use: No   • Sexual activity: Yes     Partners: Male        Current Outpatient Medications:   •  acyclovir (ZOVIRAX) 400 MG tablet, TAKE 1 BY MOUTH TWICE A DAY, Disp: , Rfl: 3  •  citalopram (CeleXA) 20 MG tablet, Take 1 tablet by mouth Daily., Disp: 90 tablet, Rfl: 3  •  doxycycline (VIBRAMYICN) 100 MG tablet, Take 100 mg by mouth 2 (Two) Times a Day., Disp: , Rfl:   •  linaclotide (Linzess) 72 MCG capsule capsule, Take 72 mcg by mouth Every Morning Before Breakfast., Disp: , Rfl:   •  SYNTHROID 150 MCG tablet, Take 1 tablet by mouth Daily., Disp: 90 tablet, Rfl: 3  •  triamcinolone (KENALOG) 0.025 % cream, APPLY EXTERNALLY TO THE AFFECTED AREA TWICE DAILY, Disp: 15 g, Rfl: 0  •  eszopiclone (Lunesta) 3 MG tablet, Take 1 tablet by mouth Every Night. Take immediately before bedtime, Disp: 30 tablet, Rfl: 3  •  rosuvastatin (Crestor) 5 MG tablet, Take 1 tablet by mouth Daily., Disp: 90 tablet, Rfl: 3     Objective     History of Present Illness     Review of Systems   Constitutional: Negative.    HENT: Negative.    Respiratory: Negative.    Cardiovascular: Negative.    Gastrointestinal: Negative.    Genitourinary: Negative.    Musculoskeletal: Negative.    Skin: Negative.    Neurological: Negative.    Psychiatric/Behavioral: Negative.        Physical Exam   Constitutional: She is oriented to person, place, and time. She appears well-developed and well-nourished.   Pleasant, cooperative no acute distress, 120/80   HENT:   Head: Normocephalic and atraumatic.   Eyes:  Conjunctivae are normal. No scleral icterus.   Neck: Normal range of motion. Neck supple.   Cardiovascular: Normal rate, regular rhythm and normal heart sounds.   Pulmonary/Chest: Effort normal and breath sounds normal. No respiratory distress. She has no wheezes. She has no rales.   Musculoskeletal: Normal range of motion. She exhibits no edema.   Neurological: She is alert and oriented to person, place, and time.   Skin: Skin is warm and dry.   Psychiatric: She has a normal mood and affect. Her behavior is normal.   Nursing note and vitals reviewed.      ASSESSMENT CBC is normal.  CMP was essentially normal aside from minimally elevated ALT of 43.  TSH is low but free T4 is normal and clinically the patient's euthyroid.  Hepatitis C screen was negative.  Lipid panel is elevated with total cholesterol 221, HDL 47   #1-hyperlipidemia  #2-hypothyroidism, euthyroid on replacement  #3-insomnia  #4-anxiety and depression, controlled on Celexa     Problem List Items Addressed This Visit        Cardiovascular and Mediastinum    Hyperlipidemia    Relevant Medications    rosuvastatin (Crestor) 5 MG tablet       Endocrine    Hypothyroidism - Primary    Relevant Medications    SYNTHROID 150 MCG tablet       Other    Generalized anxiety disorder    Relevant Medications    citalopram (CeleXA) 20 MG tablet    eszopiclone (Lunesta) 3 MG tablet    Anxiety    Relevant Medications    eszopiclone (Lunesta) 3 MG tablet          PLAN the patient received a Tdap immunization today.  I am starting her on rosuvastatin 5 mg daily.  She will continue other medications and I will recheck her in 5 to 6 weeks with a lipid panel, CMP    There are no Patient Instructions on file for this visit.  Return in about 5 weeks (around 10/9/2020) for with labs.

## 2020-09-29 DIAGNOSIS — E78.5 HYPERLIPIDEMIA, UNSPECIFIED HYPERLIPIDEMIA TYPE: Primary | ICD-10-CM

## 2020-09-30 ENCOUNTER — OFFICE VISIT (OUTPATIENT)
Dept: FAMILY MEDICINE CLINIC | Facility: CLINIC | Age: 55
End: 2020-09-30

## 2020-09-30 DIAGNOSIS — E78.5 HYPERLIPIDEMIA, UNSPECIFIED HYPERLIPIDEMIA TYPE: ICD-10-CM

## 2020-09-30 DIAGNOSIS — R10.9 RIGHT SIDED ABDOMINAL PAIN: Primary | ICD-10-CM

## 2020-09-30 PROCEDURE — 99442 PR PHYS/QHP TELEPHONE EVALUATION 11-20 MIN: CPT | Performed by: INTERNAL MEDICINE

## 2020-09-30 NOTE — PROGRESS NOTES
Javier Reno is a 55 y.o. female. Patient is here today for a telephone visit due to the COVID-19 pandemic.  She tells me she has had some abdominal pain intermittently off and on for years.  Is gradually been getting worse.  Does seem at times to be associated with eating and primarily hits her in the right upper quadrant and is associated with some nausea and occasional vomiting.  She has apparently had abdominal ultrasound and HIDA scan done in the past that were negative.  She denies any fevers or chills.     No chief complaint on file.         There were no vitals filed for this visit.  There is no height or weight on file to calculate BMI.  The following portions of the patient's history were reviewed and updated as appropriate: allergies, current medications, past family history, past medical history, past social history, past surgical history and problem list.    Past Medical History:   Diagnosis Date   • Abdominal pain 07/08/2002    NEGATIVE ABDOMINAL SERIES, SEEN AT [facility] ER   • Adenomyosis    • Adjustment disorder with mixed anxiety and depressed mood    • Allergic rhinitis    • Anxiety    • Colon polyps    • Depression    • Dermatitis 12/10/2014   • Disease of thyroid gland     HYPOTHYROIDISM   • Dyspareunia, female    • Erosive esophagitis 01/16/2003    DR. LEWIS EGD   • Eustachian tube disorder     BILATERAL   • Gastritis 01/16/2003    MILD, DR. LEWIS EGD   • Generalized anxiety disorder    • Genital herpes simplex    • History of menorrhagia    • Irritable bowel syndrome with constipation    • Left leg pain 11/08/2007    MRI OF LUMBAR SPINE AT [facility] WAS WNL   • Melanosis coli 01/16/2003    DR. CLAUDINE LEWIS CY   • Menopausal state    • Migraine    • Ovarian cyst 03/07/2002    CT SCAN AT [facility] FOR 3CM LEFT OVARIAN CYST   • Right sided abdominal pain 04/02/2002    CT SCAN SHOWED TRACE AMT OF FREE FLUID IN RIGHT HEMIPELVIS, UTERUS AND ADNEXA APPEAR UNREMARKABLE, TINY FOLLICLES WITHIN THE RIGHT OVARY    • Sliding hiatal hernia 01/16/2003     EGD       Allergies   Allergen Reactions   • Codeine    • Keflex [Cephalexin]       Social History     Socioeconomic History   • Marital status:      Spouse name: Not on file   • Number of children: Not on file   • Years of education: Not on file   • Highest education level: Not on file   Tobacco Use   • Smoking status: Never Smoker   • Smokeless tobacco: Never Used   Substance and Sexual Activity   • Alcohol use: Yes     Comment: SOCIAL   • Drug use: No   • Sexual activity: Yes     Partners: Male        Current Outpatient Medications:   •  acyclovir (ZOVIRAX) 400 MG tablet, TAKE 1 BY MOUTH TWICE A DAY, Disp: , Rfl: 3  •  citalopram (CeleXA) 20 MG tablet, Take 1 tablet by mouth Daily., Disp: 90 tablet, Rfl: 3  •  doxycycline (VIBRAMYICN) 100 MG tablet, Take 100 mg by mouth 2 (Two) Times a Day., Disp: , Rfl:   •  eszopiclone (Lunesta) 3 MG tablet, Take 1 tablet by mouth Every Night. Take immediately before bedtime, Disp: 30 tablet, Rfl: 3  •  linaclotide (Linzess) 72 MCG capsule capsule, Take 72 mcg by mouth Every Morning Before Breakfast., Disp: , Rfl:   •  rosuvastatin (Crestor) 5 MG tablet, Take 1 tablet by mouth Daily., Disp: 90 tablet, Rfl: 3  •  SYNTHROID 150 MCG tablet, Take 1 tablet by mouth Daily., Disp: 90 tablet, Rfl: 3  •  triamcinolone (KENALOG) 0.025 % cream, APPLY EXTERNALLY TO THE AFFECTED AREA TWICE DAILY, Disp: 15 g, Rfl: 0     Objective     History of Present Illness     Review of Systems   Constitutional: Negative.    HENT: Negative.    Respiratory: Negative.    Cardiovascular: Negative.    Gastrointestinal: Positive for abdominal pain, nausea and vomiting.        Pain is in the right upper quadrant generally   Genitourinary: Negative.    Musculoskeletal: Negative.    Skin: Negative.    Neurological: Negative.    Psychiatric/Behavioral: Negative.        Physical Exam    ASSESSMENT patient had laboratory studies done August 27 with a normal CMP  aside from a minimally elevated ALT of 43.  #1-longstanding abdominal pain in the right upper quadrant, gradually worsening and certainly suggestive of gallbladder disease     Problem List Items Addressed This Visit        Nervous and Auditory    Right sided abdominal pain - Primary          PLAN I want the patient to get an abdominal ultrasound of the gallbladder and a CBC, CMP, amylase and lipase and then follow-up with me.  This was a telephone visit due to the COVID-19 pandemic, total time spent 22 minutes    There are no Patient Instructions on file for this visit.  No follow-ups on file.

## 2020-10-05 ENCOUNTER — LAB (OUTPATIENT)
Dept: LAB | Facility: HOSPITAL | Age: 55
End: 2020-10-05

## 2020-10-05 ENCOUNTER — HOSPITAL ENCOUNTER (OUTPATIENT)
Dept: ULTRASOUND IMAGING | Facility: HOSPITAL | Age: 55
Discharge: HOME | End: 2020-10-05

## 2020-10-05 ENCOUNTER — RESULTS ENCOUNTER (OUTPATIENT)
Dept: FAMILY MEDICINE CLINIC | Facility: CLINIC | Age: 55
End: 2020-10-05

## 2020-10-05 DIAGNOSIS — R10.9 RIGHT SIDED ABDOMINAL PAIN: ICD-10-CM

## 2020-10-05 LAB
ALBUMIN SERPL-MCNC: 4.2 G/DL (ref 3.5–5.2)
ALBUMIN/GLOB SERPL: 1.6 G/DL
ALP SERPL-CCNC: 73 U/L (ref 39–117)
ALT SERPL W P-5'-P-CCNC: 23 U/L (ref 1–33)
AMYLASE SERPL-CCNC: 36 U/L (ref 28–100)
ANION GAP SERPL CALCULATED.3IONS-SCNC: 7.2 MMOL/L (ref 5–15)
AST SERPL-CCNC: 13 U/L (ref 1–32)
BASOPHILS # BLD AUTO: 0.02 10*3/MM3 (ref 0–0.2)
BASOPHILS NFR BLD AUTO: 0.7 % (ref 0–1.5)
BILIRUB SERPL-MCNC: 0.5 MG/DL (ref 0–1.2)
BUN SERPL-MCNC: 8 MG/DL (ref 6–20)
BUN/CREAT SERPL: 10.1 (ref 7–25)
CALCIUM SPEC-SCNC: 9.3 MG/DL (ref 8.6–10.5)
CHLORIDE SERPL-SCNC: 108 MMOL/L (ref 98–107)
CHOLEST SERPL-MCNC: 179 MG/DL (ref 0–200)
CO2 SERPL-SCNC: 24.8 MMOL/L (ref 22–29)
CREAT SERPL-MCNC: 0.79 MG/DL (ref 0.57–1)
DEPRECATED RDW RBC AUTO: 40.3 FL (ref 37–54)
EOSINOPHIL # BLD AUTO: 0.1 10*3/MM3 (ref 0–0.4)
EOSINOPHIL NFR BLD AUTO: 3.3 % (ref 0.3–6.2)
ERYTHROCYTE [DISTWIDTH] IN BLOOD BY AUTOMATED COUNT: 12.3 % (ref 12.3–15.4)
GFR SERPL CREATININE-BSD FRML MDRD: 76 ML/MIN/1.73
GLOBULIN UR ELPH-MCNC: 2.7 GM/DL
GLUCOSE SERPL-MCNC: 97 MG/DL (ref 65–99)
HCT VFR BLD AUTO: 38.7 % (ref 34–46.6)
HDLC SERPL-MCNC: 46 MG/DL (ref 40–60)
HGB BLD-MCNC: 13.1 G/DL (ref 12–15.9)
IMM GRANULOCYTES # BLD AUTO: 0 10*3/MM3 (ref 0–0.05)
IMM GRANULOCYTES NFR BLD AUTO: 0 % (ref 0–0.5)
LDLC SERPL CALC-MCNC: 115 MG/DL (ref 0–100)
LDLC/HDLC SERPL: 2.5 {RATIO}
LIPASE SERPL-CCNC: 21 U/L (ref 13–60)
LYMPHOCYTES # BLD AUTO: 1.12 10*3/MM3 (ref 0.7–3.1)
LYMPHOCYTES NFR BLD AUTO: 36.7 % (ref 19.6–45.3)
MCH RBC QN AUTO: 30.5 PG (ref 26.6–33)
MCHC RBC AUTO-ENTMCNC: 33.9 G/DL (ref 31.5–35.7)
MCV RBC AUTO: 90.2 FL (ref 79–97)
MONOCYTES # BLD AUTO: 0.35 10*3/MM3 (ref 0.1–0.9)
MONOCYTES NFR BLD AUTO: 11.5 % (ref 5–12)
NEUTROPHILS NFR BLD AUTO: 1.46 10*3/MM3 (ref 1.7–7)
NEUTROPHILS NFR BLD AUTO: 47.8 % (ref 42.7–76)
NRBC BLD AUTO-RTO: 0 /100 WBC (ref 0–0.2)
PLATELET # BLD AUTO: 204 10*3/MM3 (ref 140–450)
PMV BLD AUTO: 9.7 FL (ref 6–12)
POTASSIUM SERPL-SCNC: 4.3 MMOL/L (ref 3.5–5.2)
PROT SERPL-MCNC: 6.9 G/DL (ref 6–8.5)
RBC # BLD AUTO: 4.29 10*6/MM3 (ref 3.77–5.28)
SODIUM SERPL-SCNC: 140 MMOL/L (ref 136–145)
TRIGL SERPL-MCNC: 90 MG/DL (ref 0–150)
VLDLC SERPL-MCNC: 18 MG/DL (ref 5–40)
WBC # BLD AUTO: 3.05 10*3/MM3 (ref 3.4–10.8)

## 2020-10-05 PROCEDURE — 80053 COMPREHEN METABOLIC PANEL: CPT | Performed by: INTERNAL MEDICINE

## 2020-10-05 PROCEDURE — 76705 ECHO EXAM OF ABDOMEN: CPT

## 2020-10-05 PROCEDURE — 36415 COLL VENOUS BLD VENIPUNCTURE: CPT | Performed by: INTERNAL MEDICINE

## 2020-10-05 PROCEDURE — 83690 ASSAY OF LIPASE: CPT | Performed by: INTERNAL MEDICINE

## 2020-10-05 PROCEDURE — 82150 ASSAY OF AMYLASE: CPT | Performed by: INTERNAL MEDICINE

## 2020-10-05 PROCEDURE — 80061 LIPID PANEL: CPT | Performed by: INTERNAL MEDICINE

## 2020-10-05 PROCEDURE — 85025 COMPLETE CBC W/AUTO DIFF WBC: CPT | Performed by: INTERNAL MEDICINE

## 2020-10-13 ENCOUNTER — OFFICE VISIT (OUTPATIENT)
Dept: FAMILY MEDICINE CLINIC | Facility: CLINIC | Age: 55
End: 2020-10-13

## 2020-10-13 VITALS
OXYGEN SATURATION: 100 % | HEART RATE: 69 BPM | TEMPERATURE: 97.7 F | HEIGHT: 64 IN | WEIGHT: 172.4 LBS | RESPIRATION RATE: 16 BRPM | BODY MASS INDEX: 29.43 KG/M2 | SYSTOLIC BLOOD PRESSURE: 118 MMHG | DIASTOLIC BLOOD PRESSURE: 76 MMHG

## 2020-10-13 DIAGNOSIS — E78.5 HYPERLIPIDEMIA, UNSPECIFIED HYPERLIPIDEMIA TYPE: Primary | ICD-10-CM

## 2020-10-13 DIAGNOSIS — K21.9 GASTROESOPHAGEAL REFLUX DISEASE, UNSPECIFIED WHETHER ESOPHAGITIS PRESENT: ICD-10-CM

## 2020-10-13 DIAGNOSIS — E03.9 ACQUIRED HYPOTHYROIDISM: ICD-10-CM

## 2020-10-13 DIAGNOSIS — K58.1 IRRITABLE BOWEL SYNDROME WITH CONSTIPATION: ICD-10-CM

## 2020-10-13 DIAGNOSIS — R10.9 RIGHT SIDED ABDOMINAL PAIN: ICD-10-CM

## 2020-10-13 PROCEDURE — 99214 OFFICE O/P EST MOD 30 MIN: CPT | Performed by: INTERNAL MEDICINE

## 2020-10-13 PROCEDURE — 90471 IMMUNIZATION ADMIN: CPT | Performed by: INTERNAL MEDICINE

## 2020-10-13 PROCEDURE — 90686 IIV4 VACC NO PRSV 0.5 ML IM: CPT | Performed by: INTERNAL MEDICINE

## 2020-10-13 NOTE — PROGRESS NOTES
Javier Reno is a 55 y.o. female. Patient is here today for follow-up on her right upper quadrant abdominal pain, history of irritable bowel syndrome and history of hiatal hernia and GERD.  Since I saw her last she had laboratory studies done as well as an ultrasound and is here to review the results.  She continues with frequent GERD symptoms as well as intermittent right upper quadrant abdominal pain.  Chief Complaint   Patient presents with   • Hyperlipidemia     Lab Results   • Hypothyroidism   • Rt Sided Abdominal Pain     Results of US Of GB          Vitals:    10/13/20 1452   BP: 118/76   Pulse: 69   Resp: 16   Temp: 97.7 °F (36.5 °C)   SpO2: 100%     Body mass index is 29.59 kg/m².  The following portions of the patient's history were reviewed and updated as appropriate: allergies, current medications, past family history, past medical history, past social history, past surgical history and problem list.    Past Medical History:   Diagnosis Date   • Abdominal pain 07/08/2002    NEGATIVE ABDOMINAL SERIES, SEEN AT [facility] ER   • Adenomyosis    • Adjustment disorder with mixed anxiety and depressed mood    • Allergic rhinitis    • Anxiety    • Colon polyps    • Depression    • Dermatitis 12/10/2014   • Disease of thyroid gland     HYPOTHYROIDISM   • Dyspareunia, female    • Erosive esophagitis 01/16/2003    DR. LEWIS EGD   • Eustachian tube disorder     BILATERAL   • Gastritis 01/16/2003    MILD, DR. LEWIS EGD   • Generalized anxiety disorder    • Genital herpes simplex    • History of menorrhagia    • Irritable bowel syndrome with constipation    • Left leg pain 11/08/2007    MRI OF LUMBAR SPINE AT [facility] WAS WNL   • Melanosis coli 01/16/2003    DR. CLAUDNIE LEWIS CY   • Menopausal state    • Migraine    • Ovarian cyst 03/07/2002    CT SCAN AT [facility] FOR 3CM LEFT OVARIAN CYST   • Right sided abdominal pain 04/02/2002    CT SCAN SHOWED TRACE AMT OF FREE FLUID IN RIGHT HEMIPELVIS, UTERUS AND ADNEXA APPEAR  UNREMARKABLE, TINY FOLLICLES WITHIN THE RIGHT OVARY   • Sliding hiatal hernia 01/16/2003     EGSABA       Allergies   Allergen Reactions   • Codeine    • Keflex [Cephalexin]       Social History     Socioeconomic History   • Marital status:      Spouse name: Not on file   • Number of children: Not on file   • Years of education: Not on file   • Highest education level: Not on file   Tobacco Use   • Smoking status: Never Smoker   • Smokeless tobacco: Never Used   Substance and Sexual Activity   • Alcohol use: Yes     Comment: SOCIAL   • Drug use: No   • Sexual activity: Yes     Partners: Male        Current Outpatient Medications:   •  acyclovir (ZOVIRAX) 400 MG tablet, TAKE 1 BY MOUTH TWICE A DAY, Disp: , Rfl: 3  •  citalopram (CeleXA) 20 MG tablet, Take 1 tablet by mouth Daily., Disp: 90 tablet, Rfl: 3  •  doxycycline (VIBRAMYICN) 100 MG tablet, Take 100 mg by mouth 2 (Two) Times a Day., Disp: , Rfl:   •  eszopiclone (Lunesta) 3 MG tablet, Take 1 tablet by mouth Every Night. Take immediately before bedtime, Disp: 30 tablet, Rfl: 3  •  linaclotide (Linzess) 72 MCG capsule capsule, Take 72 mcg by mouth Every Morning Before Breakfast., Disp: , Rfl:   •  SYNTHROID 150 MCG tablet, Take 1 tablet by mouth Daily., Disp: 90 tablet, Rfl: 3  •  triamcinolone (KENALOG) 0.025 % cream, APPLY EXTERNALLY TO THE AFFECTED AREA TWICE DAILY, Disp: 15 g, Rfl: 0  •  rosuvastatin (Crestor) 5 MG tablet, Take 1 tablet by mouth Daily., Disp: 90 tablet, Rfl: 3     Objective     History of Present Illness     Review of Systems   Constitutional: Negative.    HENT: Negative.    Eyes: Negative.    Respiratory: Negative.    Cardiovascular: Negative.    Gastrointestinal: Positive for abdominal distention and abdominal pain.   Genitourinary: Negative.    Musculoskeletal: Negative.    Skin: Negative.    Neurological: Negative.    Psychiatric/Behavioral: Negative.        Physical Exam  Vitals signs and nursing note reviewed.    Constitutional:       Appearance: Normal appearance.   HENT:      Head: Normocephalic and atraumatic.   Eyes:      General: No scleral icterus.     Conjunctiva/sclera: Conjunctivae normal.   Neck:      Musculoskeletal: Normal range of motion and neck supple.   Cardiovascular:      Rate and Rhythm: Normal rate and regular rhythm.      Heart sounds: Normal heart sounds.   Pulmonary:      Effort: Pulmonary effort is normal. No respiratory distress.      Breath sounds: Normal breath sounds. No wheezing or rales.   Abdominal:      General: Abdomen is flat. Bowel sounds are normal.      Tenderness: There is no abdominal tenderness. There is no guarding.   Musculoskeletal: Normal range of motion.   Skin:     General: Skin is warm and dry.   Neurological:      General: No focal deficit present.      Mental Status: She is alert and oriented to person, place, and time.   Psychiatric:         Mood and Affect: Mood normal.         Behavior: Behavior normal.         ASSESSMENT CBC was essentially normal and CMP was normal.  Lipid panel was improved with total cholesterol 179, HDL 46 and .  Amylase and lipase were normal.  Ultrasound of the gallbladder showed no thickening inflammation or cholelithiasis and was essentially normal  #1-hyperlipidemia  #2-GERD, symptomatic  #3-intermittent right upper quadrant abdominal pain of uncertain origin  #4-hypothyroidism, euthyroid on replacement     Problems Addressed this Visit        Cardiovascular and Mediastinum    Hyperlipidemia - Primary       Digestive    Irritable bowel syndrome with constipation       Endocrine    Hypothyroidism       Nervous and Auditory    Right sided abdominal pain    Relevant Orders    NM HIDA Scan With Pharmacological Intervention      Diagnoses       Codes Comments    Hyperlipidemia, unspecified hyperlipidemia type    -  Primary ICD-10-CM: E78.5  ICD-9-CM: 272.4     Irritable bowel syndrome with constipation     ICD-10-CM: K58.1  ICD-9-CM: 564.1      Acquired hypothyroidism     ICD-10-CM: E03.9  ICD-9-CM: 244.9     Right sided abdominal pain     ICD-10-CM: R10.9  ICD-9-CM: 789.09           PLAN the patient received a flu shot today.  I am going to order a HIDA scan to be done for gallbladder functioning.  I would like patient to take over-the-counter omeprazole or Nexium regularly for her GERD symptoms.  If the HIDA scan is normal and the patient is still symptomatic, would refer her to gastroenterology.  If the HIDA scan is abnormal I would refer her to surgery.  I plan on rechecking her in 6 months with a CBC, CMP, lipid panel, TSH and free T4    There are no Patient Instructions on file for this visit.  Return in about 6 months (around 4/13/2021) for with labs.

## 2020-11-02 ENCOUNTER — HOSPITAL ENCOUNTER (OUTPATIENT)
Dept: NUCLEAR MEDICINE | Facility: HOSPITAL | Age: 55
End: 2020-11-02

## 2020-11-10 ENCOUNTER — HOSPITAL ENCOUNTER (OUTPATIENT)
Dept: NUCLEAR MEDICINE | Facility: HOSPITAL | Age: 55
Discharge: HOME | End: 2020-11-10

## 2020-11-10 DIAGNOSIS — R10.9 RIGHT SIDED ABDOMINAL PAIN: ICD-10-CM

## 2020-11-10 PROCEDURE — 78227 HEPATOBIL SYST IMAGE W/DRUG: CPT

## 2020-11-10 PROCEDURE — A9537 TC99M MEBROFENIN: HCPCS | Performed by: INTERNAL MEDICINE

## 2020-11-10 PROCEDURE — 25010000002 SINCALIDE PER 5 MCG: Performed by: INTERNAL MEDICINE

## 2020-11-10 PROCEDURE — 0 TECHNETIUM TC 99M MEBROFENIN KIT: Performed by: INTERNAL MEDICINE

## 2020-11-10 RX ORDER — KIT FOR THE PREPARATION OF TECHNETIUM TC 99M MEBROFENIN 45 MG/10ML
1 INJECTION, POWDER, LYOPHILIZED, FOR SOLUTION INTRAVENOUS
Status: COMPLETED | OUTPATIENT
Start: 2020-11-10 | End: 2020-11-10

## 2020-11-10 RX ADMIN — MEBROFENIN 1 DOSE: 45 INJECTION, POWDER, LYOPHILIZED, FOR SOLUTION INTRAVENOUS at 12:15

## 2020-11-10 RX ADMIN — SINCALIDE 1.6 MCG: 5 INJECTION, POWDER, LYOPHILIZED, FOR SOLUTION INTRAVENOUS at 12:35

## 2020-11-13 ENCOUNTER — TELEPHONE (OUTPATIENT)
Dept: FAMILY MEDICINE CLINIC | Facility: CLINIC | Age: 55
End: 2020-11-13

## 2020-11-13 DIAGNOSIS — K58.1 IRRITABLE BOWEL SYNDROME WITH CONSTIPATION: Primary | ICD-10-CM

## 2020-11-13 DIAGNOSIS — K21.9 GASTROESOPHAGEAL REFLUX DISEASE, UNSPECIFIED WHETHER ESOPHAGITIS PRESENT: ICD-10-CM

## 2020-11-13 DIAGNOSIS — R10.9 RIGHT SIDED ABDOMINAL PAIN: ICD-10-CM

## 2020-11-13 NOTE — TELEPHONE ENCOUNTER
Caller: Zee Reno    Relationship: Self    Best call back number: [phone]    What test was performed: NM HEPATOBILIARY W CCK    When was the test performed: 11/10/20    Additional notes: PATIENT REQUESTING CALL BACK WITH RESULTS

## 2021-01-05 ENCOUNTER — TRANSCRIBE ORDERS (OUTPATIENT)
Dept: ADMINISTRATIVE | Facility: HOSPITAL | Age: 56
End: 2021-01-05

## 2021-01-05 DIAGNOSIS — Z12.31 SCREENING MAMMOGRAM, ENCOUNTER FOR: Primary | ICD-10-CM

## 2021-01-25 DIAGNOSIS — F41.9 ANXIETY: ICD-10-CM

## 2021-01-27 RX ORDER — ESZOPICLONE 3 MG/1
TABLET, FILM COATED ORAL
Qty: 30 TABLET | Refills: 1 | Status: SHIPPED | OUTPATIENT
Start: 2021-01-27 | End: 2021-03-29

## 2021-01-28 ENCOUNTER — HOSPITAL ENCOUNTER (OUTPATIENT)
Dept: MAMMOGRAPHY | Facility: HOSPITAL | Age: 56
Discharge: HOME | End: 2021-01-28
Admitting: INTERNAL MEDICINE

## 2021-01-28 DIAGNOSIS — Z12.31 SCREENING MAMMOGRAM, ENCOUNTER FOR: ICD-10-CM

## 2021-01-28 PROCEDURE — 77067 SCR MAMMO BI INCL CAD: CPT

## 2021-01-28 PROCEDURE — 77063 BREAST TOMOSYNTHESIS BI: CPT

## 2021-03-03 ENCOUNTER — OFFICE VISIT (OUTPATIENT)
Dept: GASTROENTEROLOGY | Facility: CLINIC | Age: 56
End: 2021-03-03

## 2021-03-03 VITALS
HEIGHT: 64 IN | DIASTOLIC BLOOD PRESSURE: 72 MMHG | BODY MASS INDEX: 30.22 KG/M2 | WEIGHT: 177 LBS | SYSTOLIC BLOOD PRESSURE: 118 MMHG

## 2021-03-03 DIAGNOSIS — R13.19 ESOPHAGEAL DYSPHAGIA: Chronic | ICD-10-CM

## 2021-03-03 DIAGNOSIS — R11.2 NAUSEA AND VOMITING IN ADULT: Chronic | ICD-10-CM

## 2021-03-03 DIAGNOSIS — K59.09 CHRONIC CONSTIPATION: Chronic | ICD-10-CM

## 2021-03-03 DIAGNOSIS — K21.9 GASTROESOPHAGEAL REFLUX DISEASE, UNSPECIFIED WHETHER ESOPHAGITIS PRESENT: Primary | Chronic | ICD-10-CM

## 2021-03-03 PROCEDURE — 99204 OFFICE O/P NEW MOD 45 MIN: CPT | Performed by: INTERNAL MEDICINE

## 2021-03-03 RX ORDER — PANTOPRAZOLE SODIUM 20 MG/1
20 TABLET, DELAYED RELEASE ORAL
Qty: 30 TABLET | Refills: 3 | Status: SHIPPED | OUTPATIENT
Start: 2021-03-03 | End: 2021-08-31 | Stop reason: SDUPTHER

## 2021-03-03 NOTE — PROGRESS NOTES
"Chief Complaint   Patient presents with   • Abdominal Pain   • Constipation   • Heartburn       Subjective     HPI    Zee Reno is a 55 y.o. female with a past medical history noted below who presents for heartburn, nausea, vomiting, and constipation.    Symptoms present many years.     Constipation for 20 years, since c section.  Takes \"vegetable laxative\" to have a daily BM.  If she doesn't take this, she will go 4 days with no BM.  Had been on linzess but found that it gave her diarrhea and cramping and was too unpredictable for her.    Has constant reflux, frequent regurgitation of food.  Feels nauseated quite a bit.  She is currently taking OTC pepcid but no relief.  Worse with eating. The only time she feels well is when her stomach is completely empty first thing in the morning.  She does feel occasional issues with dysphagia, mostly for water.  She feels this leads to regurgitation.    Reports EGD and colonoscopy 3 years ago, she reports a small hiatal hernia.  This was in [place].    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    Today's visit was in the office.  Both the patient and I were wearing face masks and proper hand hygiene was performed before and after the physical exam.           Current Outpatient Medications:   •  acyclovir (ZOVIRAX) 400 MG tablet, TAKE 1 BY MOUTH TWICE A DAY, Disp: , Rfl: 3  •  citalopram (CeleXA) 20 MG tablet, Take 1 tablet by mouth Daily., Disp: 90 tablet, Rfl: 3  •  eszopiclone (LUNESTA) 3 MG tablet, TAKE 1 TABLET BY MOUTH EVERY NIGHT AT BEDTIME. TAKE IMMEDIATELY BEFORE BEDTIME, Disp: 30 tablet, Rfl: 1  •  rosuvastatin (Crestor) 5 MG tablet, Take 1 tablet by mouth Daily., Disp: 90 tablet, Rfl: 3  •  SYNTHROID 150 MCG tablet, Take 1 tablet by mouth Daily., Disp: 90 tablet, Rfl: 3  •  pantoprazole (PROTONIX) 20 MG EC tablet, Take 1 tablet by mouth Every Morning Before Breakfast., " Disp: 30 tablet, Rfl: 3      Objective     Vitals:    03/03/21 1409   BP: 118/72         03/03/21  1409   Weight: 80.3 kg (177 lb)     Body mass index is 30.38 kg/m².    Physical Exam  Vitals signs reviewed.   Constitutional:       General: She is not in acute distress.     Appearance: Normal appearance. She is well-developed. She is not diaphoretic.   HENT:      Head: Normocephalic.   Neck:      Thyroid: No thyromegaly.   Cardiovascular:      Rate and Rhythm: Normal rate and regular rhythm.   Pulmonary:      Effort: Pulmonary effort is normal. No respiratory distress.      Breath sounds: Normal breath sounds. No wheezing.   Abdominal:      General: Bowel sounds are normal. There is no distension.      Palpations: Abdomen is soft. Abdomen is not rigid. There is no mass.      Tenderness: There is no abdominal tenderness. There is no guarding or rebound.      Hernia: No hernia is present.   Genitourinary:     Comments: Rectal exam deferred  Musculoskeletal:         General: No tenderness.   Neurological:      Mental Status: She is alert and oriented to person, place, and time.      Motor: No atrophy.      Coordination: Coordination normal.   Psychiatric:         Behavior: Behavior normal.         Thought Content: Thought content normal.         Judgment: Judgment normal.             WBC   Date Value Ref Range Status   10/05/2020 3.05 (L) 3.40 - 10.80 10*3/mm3 Final   08/27/2020 4.56 3.40 - 10.80 10*3/mm3 Final     RBC   Date Value Ref Range Status   10/05/2020 4.29 3.77 - 5.28 10*6/mm3 Final   08/27/2020 4.57 3.77 - 5.28 10*6/mm3 Final     Hemoglobin   Date Value Ref Range Status   10/05/2020 13.1 12.0 - 15.9 g/dL Final     Hematocrit   Date Value Ref Range Status   10/05/2020 38.7 34.0 - 46.6 % Final     MCV   Date Value Ref Range Status   10/05/2020 90.2 79.0 - 97.0 fL Final     MCH   Date Value Ref Range Status   10/05/2020 30.5 26.6 - 33.0 pg Final     MCHC   Date Value Ref Range Status   10/05/2020 33.9 31.5 -  35.7 g/dL Final     RDW   Date Value Ref Range Status   10/05/2020 12.3 12.3 - 15.4 % Final     RDW-SD   Date Value Ref Range Status   10/05/2020 40.3 37.0 - 54.0 fl Final     MPV   Date Value Ref Range Status   10/05/2020 9.7 6.0 - 12.0 fL Final     Platelets   Date Value Ref Range Status   10/05/2020 204 140 - 450 10*3/mm3 Final     Neutrophil %   Date Value Ref Range Status   10/05/2020 47.8 42.7 - 76.0 % Final     Lymphocyte %   Date Value Ref Range Status   10/05/2020 36.7 19.6 - 45.3 % Final     Monocyte %   Date Value Ref Range Status   10/05/2020 11.5 5.0 - 12.0 % Final     Eosinophil %   Date Value Ref Range Status   10/05/2020 3.3 0.3 - 6.2 % Final     Basophil %   Date Value Ref Range Status   10/05/2020 0.7 0.0 - 1.5 % Final     Immature Grans %   Date Value Ref Range Status   10/05/2020 0.0 0.0 - 0.5 % Final     Neutrophils, Absolute   Date Value Ref Range Status   10/05/2020 1.46 (L) 1.70 - 7.00 10*3/mm3 Final     Lymphocytes, Absolute   Date Value Ref Range Status   10/05/2020 1.12 0.70 - 3.10 10*3/mm3 Final     Monocytes, Absolute   Date Value Ref Range Status   10/05/2020 0.35 0.10 - 0.90 10*3/mm3 Final     Eosinophils, Absolute   Date Value Ref Range Status   10/05/2020 0.10 0.00 - 0.40 10*3/mm3 Final     Basophils, Absolute   Date Value Ref Range Status   10/05/2020 0.02 0.00 - 0.20 10*3/mm3 Final     Immature Grans, Absolute   Date Value Ref Range Status   10/05/2020 0.00 0.00 - 0.05 10*3/mm3 Final     nRBC   Date Value Ref Range Status   10/05/2020 0.0 0.0 - 0.2 /100 WBC Final       Lab Results   Component Value Date    GLUCOSE 97 10/05/2020    BUN 8 10/05/2020    CREATININE 0.79 10/05/2020    EGFRIFNONA 76 10/05/2020    EGFRIFAFRI 86 08/27/2020    BCR 10.1 10/05/2020    CO2 24.8 10/05/2020    CALCIUM 9.3 10/05/2020    PROTENTOTREF 6.9 08/27/2020    ALBUMIN 4.20 10/05/2020    LABIL2 2.3 08/27/2020    AST 13 10/05/2020    ALT 23 10/05/2020       HIDA  IMPRESSION:  1. Normal hepatobiliary scan. No  evidence of cholecystitis or bile duct  obstruction.  2. Normal gallbladder contraction with CCK. Ejection fraction measures  91%.     This report was finalized on 11/10/2020 1:48 PM by Dr. Maynor Jackson MD    RU US  IMPRESSION:  No gallstones or biliary ductal dilatation.     This report was finalized on 10/5/2020 2:42 PM by Dr. Erendira Watson M.D.    I personally reviewed data as detailed below:     The labs listed above.    The radiology studies listed above.    Office notes from: PCP Sept and Oct 2020      No notes on file    Assessment/Plan    1.  GERD: No improvement with H2 blocker.  Reports EGD about 3 years ago.    2.  Chronic constipation: Taking a vegetable laxative which I am guessing is senna to have bowel movements    3.  Nausea and vomiting: With #1    4.  Esophageal dysphagia:?  Dysmotility issue versus spasm    Plan  Will get records from her EGD and colonoscopy  We will start with an esophagram  Start daily pantoprazole  Samples of Trulance given to see if this is efficacious for her constipation.  She will let me know and I can write a prescription      Diagnoses and all orders for this visit:    1. Gastroesophageal reflux disease, unspecified whether esophagitis present (Primary)  -     FL Esophagram Complete Double-Contrast; Future    2. Chronic constipation    3. Nausea and vomiting in adult  -     FL Esophagram Complete Double-Contrast; Future    4. Esophageal dysphagia    Other orders  -     pantoprazole (PROTONIX) 20 MG EC tablet; Take 1 tablet by mouth Every Morning Before Breakfast.  Dispense: 30 tablet; Refill: 3        I have discussed the above plan with the patient.  They verbalize understanding and are in agreement with the plan.  They have been advised to contact the office for any questions, concerns, or changes related to their health.    Dictated utilizing Dragon dictation

## 2021-03-04 ENCOUNTER — TELEPHONE (OUTPATIENT)
Dept: GASTROENTEROLOGY | Facility: CLINIC | Age: 56
End: 2021-03-04

## 2021-03-04 NOTE — TELEPHONE ENCOUNTER
----- Message from Cinda Reyes MD sent at 3/3/2021  4:38 PM EST -----  She had an EGD and colonoscopy at [facility] in [facility].  Can we please get those records, thanks

## 2021-03-09 ENCOUNTER — TRANSCRIBE ORDERS (OUTPATIENT)
Dept: ADMINISTRATIVE | Facility: HOSPITAL | Age: 56
End: 2021-03-09

## 2021-03-09 DIAGNOSIS — Z01.818 OTHER SPECIFIED PRE-OPERATIVE EXAMINATION: Primary | ICD-10-CM

## 2021-03-11 ENCOUNTER — OFFICE VISIT (OUTPATIENT)
Dept: OBSTETRICS AND GYNECOLOGY | Age: 56
End: 2021-03-11

## 2021-03-11 VITALS
SYSTOLIC BLOOD PRESSURE: 126 MMHG | HEIGHT: 64 IN | DIASTOLIC BLOOD PRESSURE: 68 MMHG | WEIGHT: 180.4 LBS | BODY MASS INDEX: 30.8 KG/M2

## 2021-03-11 DIAGNOSIS — Z01.419 WELL WOMAN EXAM WITH ROUTINE GYNECOLOGICAL EXAM: Primary | ICD-10-CM

## 2021-03-11 PROCEDURE — 99386 PREV VISIT NEW AGE 40-64: CPT | Performed by: NURSE PRACTITIONER

## 2021-03-11 RX ORDER — PHENYLPROPANOLAMINE/CLEMASTINE 75-1.34MG
TABLET, EXTENDED RELEASE ORAL
COMMUNITY
End: 2021-04-07

## 2021-03-11 RX ORDER — VALACYCLOVIR HYDROCHLORIDE 500 MG/1
TABLET, FILM COATED ORAL
COMMUNITY
End: 2021-05-26 | Stop reason: SDUPTHER

## 2021-03-11 RX ORDER — CITALOPRAM 20 MG/1
TABLET ORAL
COMMUNITY
End: 2021-04-07

## 2021-03-11 RX ORDER — TRETINOIN 0.5 MG/G
CREAM TOPICAL
COMMUNITY
Start: 2021-01-15 | End: 2021-06-17

## 2021-03-11 RX ORDER — ACYCLOVIR 50 MG/G
OINTMENT TOPICAL
COMMUNITY
End: 2021-05-26 | Stop reason: SDUPTHER

## 2021-03-11 RX ORDER — ESTRADIOL 1 MG/1
TABLET ORAL
COMMUNITY
End: 2021-04-07

## 2021-03-11 NOTE — PROGRESS NOTES
[facility]  Routine Annual Visit    3/11/2021    Patient: Zee Reno          MR#:[id number]      History of Present Illness    55 y.o. female  who presents for annual exam as a new patient.    Mammogram negative 2021  Hx hysterectomy w/o bso with Dr. Teresa  for menorrhagia and adenomyosis   Sees Dr. Buenrostro for primary care and Dr. Reyes, GI    She has been on and off hormones the past 5 years or so. Has struggled with severe vasomotor sxs, weight gain, and low libido. I discussed options with her today. Offered estrogen replacement therapy. She is interested in seeing Dr. Zahraa Bhardwaj for management of menopausal sxs and thyroid dysfunction and I have given her contact information.   No other complaints today      No LMP recorded. Patient has had a hysterectomy.  Obstetric History:  OB History        2    Para   2    Term   2            AB        Living   2       SAB        TAB        Ectopic        Molar        Multiple        Live Births   2               Menstrual History:     No LMP recorded. Patient has had a hysterectomy.       Sexual History:       ________________________________________  Patient Active Problem List   Diagnosis   • Acute bronchitis   • Generalized anxiety disorder   • Hypothyroidism   • Irritable bowel syndrome with constipation   • Anxiety   • Hyperlipidemia   • Primary insomnia   • Right sided abdominal pain   • Gastroesophageal reflux disease       Past Medical History:   Diagnosis Date   • Abdominal pain 2002    NEGATIVE ABDOMINAL SERIES, SEEN AT [facility] ER   • Adenomyosis    • Adjustment disorder with mixed anxiety and depressed mood    • Allergic rhinitis    • Anxiety    • Breast injury     fell and bruised rt breast mamny years.   • Colon polyps    • Depression    • Dermatitis 12/10/2014   • Disease of thyroid gland     HYPOTHYROIDISM   • Dyspareunia, female    • Erosive esophagitis 2003    DR. LEWIS EGD   • Eustachian tube  disorder     BILATERAL   • Gastritis 2003    MILD, DR. LEWIS EGD   • Generalized anxiety disorder    • Genital herpes simplex    • History of menorrhagia    • Irritable bowel syndrome with constipation    • Left leg pain 2007    MRI OF LUMBAR SPINE AT Located within [facility] WAS WNL   • Melanosis coli 2003    DR. CLAUDINE LEWIS CY   • Menopausal state    • Migraine    • Ovarian cyst 2002    CT SCAN AT [facility] FOR 3CM LEFT OVARIAN CYST   • Right sided abdominal pain 2002    CT SCAN SHOWED TRACE AMT OF FREE FLUID IN RIGHT HEMIPELVIS, UTERUS AND ADNEXA APPEAR UNREMARKABLE, TINY FOLLICLES WITHIN THE RIGHT OVARY   • Sliding hiatal hernia 2003     EGD        Past Surgical History:   Procedure Laterality Date   • AUGMENTATION MAMMAPLASTY     •  SECTION N/A 1999   • COLONOSCOPY N/A 2013    HYPERPLASTIC POLYP, RESCOPE 3 YRS. DR.RUSS RECINOS   • COLONOSCOPY N/A 2003    MELANOSIS COLI, INTERNAL HEMORRHOIDS, DR. CLAUDINE LEWIS AT [facility]   • DIAGNOSTIC LAPAROSCOPY N/A 2002    NORMAL PELVIS, NORMAL APPENDIX, QUESTIONABLE ADENOMYOSIS, DR. VEENA DURANT AT [facility]   • ENDOMETRIAL ABLATION N/A 2002    THERMACHOICE, D&C, AND HYSTEROSCOPY, DR. VEENA DURANT AT [facility]   • ENDOSCOPY N/A 2003    EROSIVE ESOPHAGITIS, SLIDING HIATAL HERNIA, MILD GASTRITIS, DR. CLAUDINE LEWIS AT [facility]   • OTHER SURGICAL HISTORY N/A 2007    SITZ MARKER STUDY- ALL MARKERS PASSED, KUB AT [facility]   • SUBTOTAL HYSTERECTOMY N/A 2010    [facility] D/T ADENOMYOSIS,MENORRHAGIA, DR. VEENA DURANT AT [facility]       Social History     Tobacco Use   Smoking Status Never Smoker   Smokeless Tobacco Never Used       Family History   Problem Relation Age of Onset   • Alzheimer's disease Mother    • Hypertension Mother    • Heart disease Maternal Grandfather    • No Known Problems Son    • Alzheimer's disease Maternal Grandmother    • No Known Problems Son    • Colon polyps Paternal Grandfather        Prior to Admission  medications    Medication Sig Start Date End Date Taking? Authorizing Provider   acyclovir (ZOVIRAX) 400 MG tablet TAKE 1 BY MOUTH TWICE A DAY 9/28/16  Yes Jonn Miranda MD   citalopram (CeleXA) 20 MG tablet Take 1 tablet by mouth Daily. 9/4/20  Yes Davidson Buenrostro MD   estradiol (ESTRACE) 1 MG tablet estradiol 1 mg tablet   Yes Jonn Miranda MD   estradiol cypionate (Depo-Estradiol) 5 MG/ML injection Depo-Estradiol 5 mg/mL intramuscular oil   Yes Jonn Miranda MD   eszopiclone (LUNESTA) 3 MG tablet TAKE 1 TABLET BY MOUTH EVERY NIGHT AT BEDTIME. TAKE IMMEDIATELY BEFORE BEDTIME 1/27/21  Yes Davidson Buenrostro MD   pantoprazole (PROTONIX) 20 MG EC tablet Take 1 tablet by mouth Every Morning Before Breakfast. 3/3/21  Yes Cinda Reyes MD   rosuvastatin (Crestor) 5 MG tablet Take 1 tablet by mouth Daily. 9/4/20  Yes Davidson Buenrostro MD   SYNTHROID 150 MCG tablet Take 1 tablet by mouth Daily. 9/4/20  Yes aDvidson Buenrostro MD   tretinoin (RETIN-A) 0.05 % cream  1/15/21  Yes Jonn Miranda MD   valACYclovir (VALTREX) 500 MG tablet valacyclovir 500 mg tablet   Yes Jonn Miradna MD   acyclovir (ZOVIRAX) 5 % ointment acyclovir 5 % topical ointment    Jonn Miranda MD   citalopram (CeleXA) 20 MG tablet citalopram 20 mg tablet   Take 1 tablet every day by oral route.    Jonn Miranda MD     ________________________________________  The following portions of the patient's history were reviewed and updated as appropriate: allergies, current medications, past family history, past medical history, past social history, past surgical history and problem list.    Review of Systems   Constitutional: Negative.    HENT: Negative.    Eyes: Negative for visual disturbance.   Respiratory: Negative for cough, shortness of breath and wheezing.    Cardiovascular: Negative for chest pain, palpitations and leg swelling.   Gastrointestinal: Negative for abdominal distention,  "abdominal pain, blood in stool, constipation, diarrhea, nausea and vomiting.   Endocrine: Negative for cold intolerance and heat intolerance.   Genitourinary: Negative for difficulty urinating, dyspareunia, dysuria, frequency, genital sores, hematuria, menstrual problem, pelvic pain, urgency, vaginal bleeding, vaginal discharge and vaginal pain.   Musculoskeletal: Negative.    Skin: Negative.    Neurological: Negative for dizziness, weakness, light-headedness, numbness and headaches.   Hematological: Negative.    Psychiatric/Behavioral: Negative.    Breasts: negative for lumps skin changes, dimpling, swelling, nipple changes/discharge bilaterally         Objective   Physical Exam    /68   Ht 162.6 cm (64\")   Wt 81.8 kg (180 lb 6.4 oz)   Breastfeeding No   BMI 30.97 kg/m²    BP Readings from Last 3 Encounters:   03/11/21 126/68   03/03/21 118/72   10/13/20 118/76      Wt Readings from Last 3 Encounters:   03/11/21 81.8 kg (180 lb 6.4 oz)   03/03/21 80.3 kg (177 lb)   10/13/20 78.2 kg (172 lb 6.4 oz)        BMI: Estimated body mass index is 30.97 kg/m² as calculated from the following:    Height as of this encounter: 162.6 cm (64\").    Weight as of this encounter: 81.8 kg (180 lb 6.4 oz).            General:   alert, appears stated age and cooperative   Heart: regular rate and rhythm, S1, S2 normal, no murmur, click, rub or gallop   Lungs: clear to auscultation bilaterally   Abdomen: soft, non-tender, without masses or organomegaly   Breast: inspection negative, no nipple discharge or bleeding, no masses or nodularity palpable   Vulva: External genitalia including bartholin's glands, Urethra, [place]'s gland and urethra meatus are normal, Perineum, rectum and anus appear normal  and Bladder appears normal without significant prolapse    Vagina: normal mucosa, normal discharge   Cervix: absent   Uterus: absent    Adnexa: no mass, fullness, tenderness     Assessment:    Diagnoses and all orders for this " visit:    1. Well woman exam with routine gynecological exam (Primary)  -     IgP, Aptima HPV      Healthy lifestyle modifications discussed, counseled on self breast exams and bone health    All of the patient's questions were addressed and answered, I have encouraged her to call for today's test results if she has not received them within 10 days.  Patient is advised to call with any change in her condition or with any other questions, otherwise return in 12 months for annual examination      PRUDENCE Dumont  3/11/2021 14:26 EST

## 2021-03-13 ENCOUNTER — LAB (OUTPATIENT)
Dept: LAB | Facility: HOSPITAL | Age: 56
End: 2021-03-13

## 2021-03-13 DIAGNOSIS — Z01.818 OTHER SPECIFIED PRE-OPERATIVE EXAMINATION: ICD-10-CM

## 2021-03-16 ENCOUNTER — APPOINTMENT (OUTPATIENT)
Dept: GENERAL RADIOLOGY | Facility: HOSPITAL | Age: 56
End: 2021-03-16

## 2021-03-17 ENCOUNTER — TELEPHONE (OUTPATIENT)
Dept: GASTROENTEROLOGY | Facility: CLINIC | Age: 56
End: 2021-03-17

## 2021-03-17 LAB
CYTOLOGIST CVX/VAG CYTO: NORMAL
CYTOLOGY CVX/VAG DOC CYTO: NORMAL
CYTOLOGY CVX/VAG DOC THIN PREP: NORMAL
DX ICD CODE: NORMAL
HIV 1 & 2 AB SER-IMP: NORMAL
HPV I/H RISK 4 DNA CVX QL PROBE+SIG AMP: NEGATIVE
OTHER STN SPEC: NORMAL
STAT OF ADQ CVX/VAG CYTO-IMP: NORMAL

## 2021-03-17 RX ORDER — PLECANATIDE 3 MG/1
1 TABLET ORAL EVERY MORNING
Qty: 30 TABLET | Refills: 3 | Status: SHIPPED | OUTPATIENT
Start: 2021-03-17

## 2021-03-17 NOTE — TELEPHONE ENCOUNTER
----- Message from Serena Titus sent at 3/17/2021 12:48 PM EDT -----  Regarding: eli  Contact: [phone]  Pt stated the Trulance 3 mg samples works for her and would like a script called in please. Thank you    [facility] myWebRoom #[id number] [facility] [address] [facility] [address] [facility] - [phone]  - [phone] FX

## 2021-03-18 ENCOUNTER — TELEPHONE (OUTPATIENT)
Dept: GASTROENTEROLOGY | Facility: CLINIC | Age: 56
End: 2021-03-18

## 2021-03-28 DIAGNOSIS — F41.9 ANXIETY: ICD-10-CM

## 2021-03-30 ENCOUNTER — BULK ORDERING (OUTPATIENT)
Dept: CASE MANAGEMENT | Facility: OTHER | Age: 56
End: 2021-03-30

## 2021-03-30 DIAGNOSIS — Z23 IMMUNIZATION DUE: ICD-10-CM

## 2021-03-30 RX ORDER — ESZOPICLONE 3 MG/1
TABLET, FILM COATED ORAL
Qty: 30 TABLET | Refills: 0 | Status: SHIPPED | OUTPATIENT
Start: 2021-03-30 | End: 2021-04-23

## 2021-03-31 ENCOUNTER — LAB (OUTPATIENT)
Dept: LAB | Facility: HOSPITAL | Age: 56
End: 2021-03-31

## 2021-03-31 PROCEDURE — C9803 HOPD COVID-19 SPEC COLLECT: HCPCS

## 2021-03-31 PROCEDURE — U0004 COV-19 TEST NON-CDC HGH THRU: HCPCS

## 2021-04-01 LAB — SARS-COV-2 RNA RESP QL NAA+PROBE: NOT DETECTED

## 2021-04-02 ENCOUNTER — APPOINTMENT (OUTPATIENT)
Dept: GENERAL RADIOLOGY | Facility: HOSPITAL | Age: 56
End: 2021-04-02

## 2021-04-02 ENCOUNTER — HOSPITAL ENCOUNTER (OUTPATIENT)
Dept: GENERAL RADIOLOGY | Facility: HOSPITAL | Age: 56
Discharge: HOME | End: 2021-04-02
Admitting: INTERNAL MEDICINE

## 2021-04-02 DIAGNOSIS — K21.9 GASTROESOPHAGEAL REFLUX DISEASE, UNSPECIFIED WHETHER ESOPHAGITIS PRESENT: Chronic | ICD-10-CM

## 2021-04-02 DIAGNOSIS — R11.2 NAUSEA AND VOMITING IN ADULT: Chronic | ICD-10-CM

## 2021-04-02 PROCEDURE — 74221 X-RAY XM ESOPHAGUS 2CNTRST: CPT

## 2021-04-02 RX ADMIN — ANTACID/ANTIFLATULENT 1 TABLET: 380; 550; 10; 10 GRANULE, EFFERVESCENT ORAL at 14:30

## 2021-04-02 RX ADMIN — BARIUM SULFATE 340 ML: 980 POWDER, FOR SUSPENSION ORAL at 14:30

## 2021-04-02 RX ADMIN — BARIUM SULFATE 183 ML: 960 POWDER, FOR SUSPENSION ORAL at 14:30

## 2021-04-02 RX ADMIN — BARIUM SULFATE 700 MG: 700 TABLET ORAL at 14:30

## 2021-04-05 ENCOUNTER — TELEPHONE (OUTPATIENT)
Dept: GASTROENTEROLOGY | Facility: CLINIC | Age: 56
End: 2021-04-05

## 2021-04-05 RX ORDER — SUCRALFATE ORAL 1 G/10ML
1 SUSPENSION ORAL
Qty: 420 ML | Refills: 1 | Status: SHIPPED | OUTPATIENT
Start: 2021-04-05 | End: 2021-06-17

## 2021-04-05 NOTE — PROGRESS NOTES
Esophagram test shows a small hiatal hernia.  The barium tablet passed without issue.  No evidence of reflux or dysmotility or obstruction.Continue pantoprazole for now.

## 2021-04-05 NOTE — TELEPHONE ENCOUNTER
The hiatal hernia is very small, it is unlikely to be causing significant problems and a surgery is usually not recommended in this setting.    I am ordering carafate in addition to the ppi. Take as directed.    Next step would be repeating the EGD if no improvement in the next 4 weeks.

## 2021-04-05 NOTE — TELEPHONE ENCOUNTER
----- Message from Cinda Reyes MD sent at 4/5/2021 10:46 AM EDT -----  Esophagram test shows a small hiatal hernia.  The barium tablet passed without issue.  No evidence of reflux or dysmotility or obstruction.Continue pantoprazole for now.

## 2021-04-05 NOTE — TELEPHONE ENCOUNTER
Call from pt.  Advise per Dr Reyes note.  Verb understanding.      Reports that pantoprazole not helping - still has reflux.      Asking if should have hiatal hernia repaired - wonders if that root of her problems.     Questions to Dr Reyes.

## 2021-04-07 ENCOUNTER — OFFICE VISIT (OUTPATIENT)
Dept: FAMILY MEDICINE CLINIC | Facility: CLINIC | Age: 56
End: 2021-04-07

## 2021-04-07 DIAGNOSIS — K21.9 GASTROESOPHAGEAL REFLUX DISEASE, UNSPECIFIED WHETHER ESOPHAGITIS PRESENT: ICD-10-CM

## 2021-04-07 DIAGNOSIS — E78.5 HYPERLIPIDEMIA, UNSPECIFIED HYPERLIPIDEMIA TYPE: Primary | ICD-10-CM

## 2021-04-07 DIAGNOSIS — F51.01 PRIMARY INSOMNIA: ICD-10-CM

## 2021-04-07 DIAGNOSIS — E03.9 ACQUIRED HYPOTHYROIDISM: ICD-10-CM

## 2021-04-07 PROCEDURE — 99214 OFFICE O/P EST MOD 30 MIN: CPT | Performed by: INTERNAL MEDICINE

## 2021-04-07 NOTE — TELEPHONE ENCOUNTER
"Call to pt.  Advise per DR Reyes note.  Verb understanding.     States has constant tenderness under R rib cage.  Can feel \"nodule about 1/2\" long under my skin\".  Persistent constipation and nausea.  States threw up last evening after eating crackers.      States this has been going on since egd about 3 yrs ago - wonders if something was nicked.  Would like to proceed with EGD.     Asking what carafate does - advise this coats esophagus and stomach and protects from acid reflux.  States will try this, but thinks should proceed with EGD.     Message to DR Reyes.   "

## 2021-04-07 NOTE — PROGRESS NOTES
Javier Reno is a 55 y.o. female. Patient is here today for a telephone visit due to the COVID-19 pandemic.  Patient was specifically asked about a telephone visit and consents to a telephone visit.  She generally is feeling well.  She saw her gynecologist and is going to switch and try [place] Thyroid.  In addition she is also getting some hormone pellets because of menopause.  Her insomnia is doing fine on Lunesta and she does have EGD done which showed a hiatal hernia and is having reflux symptoms and was started on low-dose Protonix.  Otherwise nothing acute.  She does have some concerns about the Covid vaccinations and we discussed that     No chief complaint on file.         There were no vitals filed for this visit.  There is no height or weight on file to calculate BMI.  The following portions of the patient's history were reviewed and updated as appropriate: allergies, current medications, past family history, past medical history, past social history, past surgical history and problem list.    Past Medical History:   Diagnosis Date   • Abdominal pain 07/08/2002    NEGATIVE ABDOMINAL SERIES, SEEN AT [facility] ER   • Adenomyosis    • Adjustment disorder with mixed anxiety and depressed mood    • Allergic rhinitis    • Anxiety    • Breast injury     fell and bruised rt breast mamny years.   • Colon polyps    • Depression    • Dermatitis 12/10/2014   • Disease of thyroid gland     HYPOTHYROIDISM   • Dyspareunia, female    • Erosive esophagitis 01/16/2003    DR. LEWIS EGD   • Eustachian tube disorder     BILATERAL   • Gastritis 01/16/2003    MILD, DR. LEWIS EGD   • Generalized anxiety disorder    • Genital herpes simplex    • History of menorrhagia    • Irritable bowel syndrome with constipation    • Left leg pain 11/08/2007    MRI OF LUMBAR SPINE AT [facility] WAS WNL   • Melanosis coli 01/16/2003    DR. CLAUDINE LEWIS CY   • Menopausal state    • Migraine    • Ovarian cyst 03/07/2002    CT SCAN AT [facility] FOR 3CM LEFT  OVARIAN CYST   • Right sided abdominal pain 04/02/2002    CT SCAN SHOWED TRACE AMT OF FREE FLUID IN RIGHT HEMIPELVIS, UTERUS AND ADNEXA APPEAR UNREMARKABLE, TINY FOLLICLES WITHIN THE RIGHT OVARY   • Sliding hiatal hernia 01/16/2003     EGSABA       Allergies   Allergen Reactions   • Codeine    • Keflex [Cephalexin]       Social History     Socioeconomic History   • Marital status:      Spouse name: Not on file   • Number of children: Not on file   • Years of education: Not on file   • Highest education level: Not on file   Tobacco Use   • Smoking status: Never Smoker   • Smokeless tobacco: Never Used   Substance and Sexual Activity   • Alcohol use: Yes     Comment: SOCIAL   • Drug use: No   • Sexual activity: Yes     Partners: Male     Birth control/protection: Surgical        Current Outpatient Medications:   •  acyclovir (ZOVIRAX) 400 MG tablet, TAKE 1 BY MOUTH TWICE A DAY, Disp: , Rfl: 3  •  acyclovir (ZOVIRAX) 5 % ointment, acyclovir 5 % topical ointment, Disp: , Rfl:   •  citalopram (CeleXA) 20 MG tablet, Take 1 tablet by mouth Daily., Disp: 90 tablet, Rfl: 3  •  citalopram (CeleXA) 20 MG tablet, citalopram 20 mg tablet  Take 1 tablet every day by oral route., Disp: , Rfl:   •  estradiol (ESTRACE) 1 MG tablet, estradiol 1 mg tablet, Disp: , Rfl:   •  estradiol cypionate (Depo-Estradiol) 5 MG/ML injection, Depo-Estradiol 5 mg/mL intramuscular oil, Disp: , Rfl:   •  eszopiclone (LUNESTA) 3 MG tablet, TAKE 1 TABLET BY MOUTH EVERY NIGHT AT BEDTIME, Disp: 30 tablet, Rfl: 0  •  pantoprazole (PROTONIX) 20 MG EC tablet, Take 1 tablet by mouth Every Morning Before Breakfast., Disp: 30 tablet, Rfl: 3  •  Plecanatide (Trulance) 3 MG tablet, Take 1 tablet by mouth Every Morning., Disp: 30 tablet, Rfl: 3  •  rosuvastatin (Crestor) 5 MG tablet, Take 1 tablet by mouth Daily., Disp: 90 tablet, Rfl: 3  •  sucralfate (Carafate) 1 GM/10ML suspension, Take 10 mL by mouth 3 (Three) Times a Day Before Meals., Disp: 420  mL, Rfl: 1  •  SYNTHROID 150 MCG tablet, Take 1 tablet by mouth Daily., Disp: 90 tablet, Rfl: 3  •  tretinoin (RETIN-A) 0.05 % cream, , Disp: , Rfl:   •  valACYclovir (VALTREX) 500 MG tablet, valacyclovir 500 mg tablet, Disp: , Rfl:      Objective     History of Present Illness     Review of Systems   Constitutional: Negative.    HENT: Negative.    Respiratory: Negative.    Cardiovascular: Negative.    Gastrointestinal: Negative.    Genitourinary: Negative.    Musculoskeletal: Negative.    Skin: Negative.    Neurological: Negative.    Hematological: Negative.    Psychiatric/Behavioral: Negative.        Physical Exam    ASSESSMENT there are no recent labs to review.  The patient did have labs drawn at gynecology and is going to send those to us.  She will try the [place] Thyroid in place of Synthroid and see how she does.  #1-hypothyroidism, trying [place] Thyroid  #2-menopausal symptoms are related to low hormones, getting treatment from gynecology  #3-insomnia, controlled on Lunesta  #4-concerns about COVID-19 vaccination, discussion concerning that and strong recommendation that she and her  get it.  #5-GERD, on Protonix  #6-hyperlipidemia, asymptomatic     Problems Addressed this Visit     None      Diagnoses    None.         PLAN I encouraged the patient get COVID-19 vaccination definitely.  She will continue current medicines as now and will try the [place] Thyroid and some hormone replacement per gynecology.  I would like to recheck her in about 6 months with a CBC, CMP, lipid panel, TSH and free T4 and free T3, vitamin D level  This was a telephone visit due to the COVID-19 pandemic, total time spent 22 minutes    There are no Patient Instructions on file for this visit.  No follow-ups on file.

## 2021-04-08 DIAGNOSIS — K21.9 GASTROESOPHAGEAL REFLUX DISEASE, UNSPECIFIED WHETHER ESOPHAGITIS PRESENT: Primary | ICD-10-CM

## 2021-04-08 DIAGNOSIS — R13.19 ESOPHAGEAL DYSPHAGIA: ICD-10-CM

## 2021-04-08 RX ORDER — SODIUM CHLORIDE, SODIUM LACTATE, POTASSIUM CHLORIDE, CALCIUM CHLORIDE 600; 310; 30; 20 MG/100ML; MG/100ML; MG/100ML; MG/100ML
30 INJECTION, SOLUTION INTRAVENOUS CONTINUOUS
Status: CANCELLED | OUTPATIENT
Start: 2021-07-19

## 2021-04-09 NOTE — TELEPHONE ENCOUNTER
VM to pt.  Per [facility] release, advise per DR Reyes note.  Scheduling will contact to arrange.  Contact office if any questions.

## 2021-04-12 ENCOUNTER — TELEPHONE (OUTPATIENT)
Dept: GASTROENTEROLOGY | Facility: CLINIC | Age: 56
End: 2021-04-12

## 2021-04-12 NOTE — TELEPHONE ENCOUNTER
spoke with pt mailed her scheduling reminder letter per her request since she is unsure of when she can schedule since she has started a new job

## 2021-04-22 DIAGNOSIS — F41.9 ANXIETY: ICD-10-CM

## 2021-04-22 NOTE — TELEPHONE ENCOUNTER
Caller: Zee Reno    Relationship: Self    Best call back number: [phone] (H)    Medication needed:   Requested Prescriptions     Pending Prescriptions Disp Refills   • eszopiclone (LUNESTA) 3 MG tablet [Pharmacy Med Name: ESZOPICLONE 3MG TABLETS] 30 tablet      Sig: TAKE 1 TABLET BY MOUTH EVERY NIGHT AT BEDTIME       When do you need the refill by: ASAP    What additional details did the patient provide when requesting the medication: PATIENT CALLED TO REQUEST A MEDICATION REFILL ONHER MEDICATION. PATIENT STATES THAT SHE HAS A 3 DAY SUPPLY LEFT. PATIENT STATES THAT SHE WILL LEAVING ON 04/24/21 AND NEEDS TO GET THIS MEDICATION BEFORE SHE LEAVES.          PLEASE CONTACT PATIENT ONCE PRESCRIPTION HAS BEEN SENT TO PHARMACY SO SHE CAN GO PICK IT UP.       Does the patient have less than a 3 day supply:  [] Yes  [x] No    What is the patient's preferred pharmacy: [id number] [address] [facility] [phone] [facility] [phone] FX       THANKS

## 2021-04-23 RX ORDER — ESZOPICLONE 3 MG/1
TABLET, FILM COATED ORAL
Qty: 30 TABLET | Refills: 0 | Status: SHIPPED | OUTPATIENT
Start: 2021-04-23 | End: 2021-06-17

## 2021-05-26 ENCOUNTER — PATIENT MESSAGE (OUTPATIENT)
Dept: FAMILY MEDICINE CLINIC | Facility: CLINIC | Age: 56
End: 2021-05-26

## 2021-05-26 RX ORDER — ACYCLOVIR 50 MG/G
OINTMENT TOPICAL DAILY
Qty: 30 G | Refills: 2 | Status: SHIPPED | OUTPATIENT
Start: 2021-05-26 | End: 2022-01-28

## 2021-05-26 RX ORDER — VALACYCLOVIR HYDROCHLORIDE 500 MG/1
500 TABLET, FILM COATED ORAL DAILY
Qty: 30 TABLET | Refills: 2 | Status: SHIPPED | OUTPATIENT
Start: 2021-05-26 | End: 2021-06-28

## 2021-05-26 NOTE — TELEPHONE ENCOUNTER
From: Zee Reno  To: Davidson Buenrostro MD  Sent: 5/26/2021 11:43 AM EDT  Subject: Prescription Question    Hi Dr. Buenrostro,       Can you please call a refill in for me for the valacyclovir 500 mg once day and the acyclovir ointment ...    With refills I forgot to get you to send me refills last year when we move back to on this medication ...my old doctor had me on this as needed thanks so much for your help    Zee Reno   [birth year]

## 2021-06-02 ENCOUNTER — TELEPHONE (OUTPATIENT)
Dept: PEDIATRICS | Facility: OTHER | Age: 56
End: 2021-06-02

## 2021-06-02 NOTE — TELEPHONE ENCOUNTER
l     Caller:     Relationship:     Best call back number: [phone]    What is the best time to reach you:     Who are you requesting to speak with (clinical staff, provider,  specific staff member): NURSE OR PHYSICIAN       What was the call regarding: SAID SHE CALLED LAST WEEK TO GET PRESCRIPTION FILLED. PHARMACY SAYS THAT THEY STILL DON'T HAVE IT. THE PHARMACY TOLD HER IT WAS WAITING ON PRIOR AUTHORIZATION.  ACYCLOVIR  OINTMENT. DOESN'T USE EVERYDAY. ONLY USES AS REQUIRED. PLEASE CALL IN OR ADVISE.      Do you require a callback: YES

## 2021-06-07 ENCOUNTER — TELEPHONE (OUTPATIENT)
Dept: GASTROENTEROLOGY | Facility: CLINIC | Age: 56
End: 2021-06-07

## 2021-06-07 PROBLEM — R13.19 ESOPHAGEAL DYSPHAGIA: Status: ACTIVE | Noted: 2021-06-07

## 2021-06-07 NOTE — TELEPHONE ENCOUNTER
spoke with pt scheduled at [facility] on july 19 arrive at 100 pm hollie gonzales

## 2021-06-07 NOTE — TELEPHONE ENCOUNTER
----- Message from Serena Maxwell sent at 6/7/2021  9:22 AM EDT -----  Regarding: Schedule scope  Contact: [phone]  PT needs to schedule scope

## 2021-06-08 ENCOUNTER — TELEPHONE (OUTPATIENT)
Dept: GASTROENTEROLOGY | Facility: CLINIC | Age: 56
End: 2021-06-08

## 2021-06-08 NOTE — TELEPHONE ENCOUNTER
----- Message from Serena Maxwell sent at 6/7/2021  9:22 AM EDT -----  Regarding: MRI  Contact: [phone]  PT would like to have an MRI done on Abdominal area, please advise

## 2021-06-08 NOTE — TELEPHONE ENCOUNTER
Called pt and pt reports that she now has insurance and is still having pain in rt side,  She is asking if she can get an mri of her abd. Advised will send message to Dr Reyes.

## 2021-06-09 ENCOUNTER — TELEPHONE (OUTPATIENT)
Dept: GASTROENTEROLOGY | Facility: CLINIC | Age: 56
End: 2021-06-09

## 2021-06-09 NOTE — TELEPHONE ENCOUNTER
----- Message from Zee Rowdy sent at 6/9/2021  1:07 PM EDT -----  Regarding: Non-Urgent Medical Question  Contact: [phone]  Hi Dr. Spaulding I spoke with your nurse Noy yesterday and they were going to check to see if my insurance required me to get authorizations for a MRI or a CAT scan of my abdomen and hip [place] area before I can get the scope done I really would love to try to get everything done as soon as possible my stomach has really been bugging me lately and I've been with my new job for two months now so I think it's OK for me to ask off now is there anyway they could check with my insurance to see if I can get a CAT scan or an MRI done thank you for your time

## 2021-06-09 NOTE — TELEPHONE ENCOUNTER
She is scheduled for an EGD soon.  If symptoms have changed indicating need for imaging would need to discuss that in the office, thanks

## 2021-06-09 NOTE — TELEPHONE ENCOUNTER
Pt's message forwarded to Dr Reyes.    Recommended artificial tears to use: 1 drop 4x a day in both eyes.

## 2021-06-10 ENCOUNTER — OFFICE VISIT (OUTPATIENT)
Dept: FAMILY MEDICINE CLINIC | Facility: CLINIC | Age: 56
End: 2021-06-10

## 2021-06-10 VITALS
TEMPERATURE: 97.3 F | BODY MASS INDEX: 30.39 KG/M2 | HEIGHT: 64 IN | RESPIRATION RATE: 18 BRPM | DIASTOLIC BLOOD PRESSURE: 70 MMHG | WEIGHT: 178 LBS | OXYGEN SATURATION: 98 % | SYSTOLIC BLOOD PRESSURE: 120 MMHG | HEART RATE: 76 BPM

## 2021-06-10 DIAGNOSIS — E78.5 HYPERLIPIDEMIA, UNSPECIFIED HYPERLIPIDEMIA TYPE: ICD-10-CM

## 2021-06-10 DIAGNOSIS — F41.1 GENERALIZED ANXIETY DISORDER: ICD-10-CM

## 2021-06-10 DIAGNOSIS — N32.89 BLADDER IRRITABILITY: ICD-10-CM

## 2021-06-10 DIAGNOSIS — K58.1 IRRITABLE BOWEL SYNDROME WITH CONSTIPATION: ICD-10-CM

## 2021-06-10 DIAGNOSIS — K21.9 GASTROESOPHAGEAL REFLUX DISEASE, UNSPECIFIED WHETHER ESOPHAGITIS PRESENT: ICD-10-CM

## 2021-06-10 DIAGNOSIS — E03.9 ACQUIRED HYPOTHYROIDISM: ICD-10-CM

## 2021-06-10 DIAGNOSIS — R35.0 URINARY FREQUENCY: Primary | ICD-10-CM

## 2021-06-10 LAB
BILIRUB BLD-MCNC: NEGATIVE MG/DL
CLARITY, POC: CLEAR
COLOR UR: YELLOW
GLUCOSE UR STRIP-MCNC: NEGATIVE MG/DL
KETONES UR QL: NEGATIVE
LEUKOCYTE EST, POC: NEGATIVE
NITRITE UR-MCNC: NEGATIVE MG/ML
PH UR: 6 [PH] (ref 5–8)
PROT UR STRIP-MCNC: NEGATIVE MG/DL
RBC # UR STRIP: NEGATIVE /UL
SP GR UR: 1.02 (ref 1–1.03)
UROBILINOGEN UR QL: NORMAL

## 2021-06-10 PROCEDURE — 99214 OFFICE O/P EST MOD 30 MIN: CPT | Performed by: INTERNAL MEDICINE

## 2021-06-10 PROCEDURE — 81003 URINALYSIS AUTO W/O SCOPE: CPT | Performed by: INTERNAL MEDICINE

## 2021-06-10 NOTE — PROGRESS NOTES
Javier Reno is a 55 y.o. female. Patient is here today for some urinary problems.  She is having urgency, some frequency but not really angel dysuria and no CVA pain.  She has had no fevers or chills.  Its sounding more like bladder irritability.  Chief Complaint   Patient presents with   • Urinary Frequency     BURNING- THINKS SHE HAS A UTI           Vitals:    06/10/21 1021   BP: 120/70   Pulse: 76   Resp: 18   Temp: 97.3 °F (36.3 °C)   SpO2: 98%     Body mass index is 30.54 kg/m².  The following portions of the patient's history were reviewed and updated as appropriate: allergies, current medications, past family history, past medical history, past social history, past surgical history and problem list.    Past Medical History:   Diagnosis Date   • Abdominal pain 07/08/2002    NEGATIVE [facility]BDOM[facility]   • Adenomyosis    • Adjustment disorder with mixed anxiety and depressed mood    • Allergic rhinitis    • Anxiety    • Breast injury     fell and bruised rt breast mamny years.   • Colon polyps    • Depression    • Dermatitis 12/10/2014   • Disease of thyroid gland     HYPOTHYROIDISM   • Dyspareunia, female    • Erosive esophagitis 01/16/2003    DR. LEWIS EGD   • Eustachian tube disorder     BILATERAL   • Gastritis 01/16/2003    MILD, DR. LEWIS EGD   • Generalized anxiety disorder    • Genital herpes simplex    • History of menorrhagia    • Irritable bowel syndrome with constipation    • Left leg pain 11/08/2007    [facility]   • Melanosis coli 01/16/2003    DR. CLAUDINE LEWIS CY   • Menopausal state    • Migraine    • Ovarian cyst 03/07/2002    [facility] LEFT OVARIAN CYST   • Right sided abdominal pain 04/02/2002    CT SCAN SHOWED TRACE AMT OF FREE FLUID IN RIGHT HEMIPELVIS, UTERUS AND ADNEXA APPEAR UNREMARKABLE, TINY FOLLICLES WITHIN THE RIGHT OVARY   • Sliding hiatal hernia 01/16/2003     EGD       Allergies   Allergen Reactions   • Codeine     • Keflex [Cephalexin]       Social History     Socioeconomic History   • Marital status:      Spouse name: Not on file   • Number of children: Not on file   • Years of education: Not on file   • Highest education level: Not on file   Tobacco Use   • Smoking status: Never Smoker   • Smokeless tobacco: Never Used   Substance and Sexual Activity   • Alcohol use: Not Currently     Alcohol/week: 0.0 standard drinks     Comment: SOCIAL   • Drug use: No   • Sexual activity: Not Currently     Partners: Male     Birth control/protection: Surgical        Current Outpatient Medications:   •  acyclovir (ZOVIRAX) 400 MG tablet, TAKE 1 BY MOUTH TWICE A DAY, Disp: , Rfl: 3  •  acyclovir (ZOVIRAX) 5 % ointment, Apply  topically to the appropriate area as directed Daily., Disp: 30 g, Rfl: 2  •  citalopram (CeleXA) 20 MG tablet, Take 1 tablet by mouth Daily., Disp: 90 tablet, Rfl: 3  •  eszopiclone (LUNESTA) 3 MG tablet, TAKE 1 TABLET BY MOUTH EVERY NIGHT AT BEDTIME, Disp: 30 tablet, Rfl: 0  •  pantoprazole (PROTONIX) 20 MG EC tablet, Take 1 tablet by mouth Every Morning Before Breakfast., Disp: 30 tablet, Rfl: 3  •  Plecanatide (Trulance) 3 MG tablet, Take 1 tablet by mouth Every Morning., Disp: 30 tablet, Rfl: 3  •  rosuvastatin (Crestor) 5 MG tablet, Take 1 tablet by mouth Daily., Disp: 90 tablet, Rfl: 3  •  sucralfate (Carafate) 1 GM/10ML suspension, Take 10 mL by mouth 3 (Three) Times a Day Before Meals., Disp: 420 mL, Rfl: 1  •  SYNTHROID 150 MCG tablet, Take 1 tablet by mouth Daily., Disp: 90 tablet, Rfl: 3  •  tretinoin (RETIN-A) 0.05 % cream, , Disp: , Rfl:   •  valACYclovir (VALTREX) 500 MG tablet, Take 1 tablet by mouth Daily., Disp: 30 tablet, Rfl: 2  •  Mirabegron ER (Myrbetriq) 25 MG tablet sustained-release 24 hour 24 hr tablet, Take 1 tablet by mouth Daily., Disp: 14 tablet, Rfl: 0     Objective     History of Present Illness     Review of Systems   Constitutional: Negative.  Negative for chills.   HENT:  Negative.    Respiratory: Negative.    Cardiovascular: Negative.    Gastrointestinal: Positive for nausea. Negative for vomiting.   Genitourinary: Positive for dysuria, frequency and urgency. Negative for hematuria.   Musculoskeletal: Negative.    Skin: Negative.    Neurological: Negative.    Hematological: Negative.    Psychiatric/Behavioral: Negative.        Physical Exam  Vitals and nursing note reviewed.   Constitutional:       General: She is not in acute distress.     Appearance: Normal appearance. She is not ill-appearing.   HENT:      Head: Normocephalic and atraumatic.   Eyes:      General: No scleral icterus.     Conjunctiva/sclera: Conjunctivae normal.   Cardiovascular:      Rate and Rhythm: Normal rate and regular rhythm.      Heart sounds: Normal heart sounds.   Pulmonary:      Effort: Pulmonary effort is normal. No respiratory distress.      Breath sounds: Normal breath sounds. No wheezing or rales.   Abdominal:      General: Abdomen is flat. Bowel sounds are normal.      Palpations: Abdomen is soft.      Tenderness: There is no right CVA tenderness or left CVA tenderness.   Musculoskeletal:         General: Normal range of motion.      Cervical back: Normal range of motion.   Skin:     General: Skin is warm and dry.   Neurological:      General: No focal deficit present.      Mental Status: She is alert and oriented to person, place, and time.   Psychiatric:         Mood and Affect: Mood normal.         Behavior: Behavior normal.         ASSESSMENT urinalysis was completely clear and normal.  #1-symptoms of urgency and frequency, possible bladder irritability  #2-hyperlipidemia, asymptomatic  #3-hypothyroidism  #4-IBS and GERD     Problems Addressed this Visit        Cardiac and Vasculature    Hyperlipidemia    Relevant Orders    Comprehensive Metabolic Panel    Lipid Panel With LDL / HDL Ratio       Endocrine and Metabolic    Hypothyroidism    Relevant Orders    Comprehensive Metabolic Panel    TSH     T4, Free       Gastrointestinal Abdominal     Irritable bowel syndrome with constipation    Relevant Orders    Comprehensive Metabolic Panel    Gastroesophageal reflux disease       Mental Health    Generalized anxiety disorder    Relevant Orders    Comprehensive Metabolic Panel      Other Visit Diagnoses     Urinary frequency    -  Primary    Relevant Orders    POC Urinalysis Dipstick, Automated (Completed)    Urine Culture - , Urine, Clean Catch    Bladder irritability          Diagnoses       Codes Comments    Urinary frequency    -  Primary ICD-10-CM: R35.0  ICD-9-CM: 788.41     Bladder irritability     ICD-10-CM: N32.89  ICD-9-CM: 596.89     Hyperlipidemia, unspecified hyperlipidemia type     ICD-10-CM: E78.5  ICD-9-CM: 272.4     Acquired hypothyroidism     ICD-10-CM: E03.9  ICD-9-CM: 244.9     Irritable bowel syndrome with constipation     ICD-10-CM: K58.1  ICD-9-CM: 564.1     Gastroesophageal reflux disease, unspecified whether esophagitis present     ICD-10-CM: K21.9  ICD-9-CM: 530.81     Generalized anxiety disorder     ICD-10-CM: F41.1  ICD-9-CM: 300.02           PLAN I have ordered a urine culture and sensitivity to be done.  Patient will have blood tests drawn today.  I gave her samples of Myrbetriq 25 mg daily to start on and had like to recheck her next week to review the laboratory results and see how she is doing.    There are no Patient Instructions on file for this visit.  Return in about 1 week (around 6/17/2021).

## 2021-06-11 LAB
ALBUMIN SERPL-MCNC: 4.6 G/DL (ref 3.8–4.9)
ALBUMIN/GLOB SERPL: 2.3 {RATIO} (ref 1.2–2.2)
ALP SERPL-CCNC: 69 IU/L (ref 48–121)
ALT SERPL-CCNC: 25 IU/L (ref 0–32)
AST SERPL-CCNC: 19 IU/L (ref 0–40)
BILIRUB SERPL-MCNC: 0.6 MG/DL (ref 0–1.2)
BUN SERPL-MCNC: 10 MG/DL (ref 6–24)
BUN/CREAT SERPL: 13 (ref 9–23)
CALCIUM SERPL-MCNC: 9.4 MG/DL (ref 8.7–10.2)
CHLORIDE SERPL-SCNC: 104 MMOL/L (ref 96–106)
CHOLEST SERPL-MCNC: 210 MG/DL (ref 100–199)
CO2 SERPL-SCNC: 23 MMOL/L (ref 20–29)
CREAT SERPL-MCNC: 0.8 MG/DL (ref 0.57–1)
GLOBULIN SER CALC-MCNC: 2 G/DL (ref 1.5–4.5)
GLUCOSE SERPL-MCNC: 75 MG/DL (ref 65–99)
HDLC SERPL-MCNC: 39 MG/DL
LDLC SERPL CALC-MCNC: 144 MG/DL (ref 0–99)
LDLC/HDLC SERPL: 3.7 RATIO (ref 0–3.2)
POTASSIUM SERPL-SCNC: 4.2 MMOL/L (ref 3.5–5.2)
PROT SERPL-MCNC: 6.6 G/DL (ref 6–8.5)
SODIUM SERPL-SCNC: 141 MMOL/L (ref 134–144)
T4 FREE SERPL-MCNC: 1.45 NG/DL (ref 0.82–1.77)
TRIGL SERPL-MCNC: 150 MG/DL (ref 0–149)
TSH SERPL DL<=0.005 MIU/L-ACNC: 0.01 UIU/ML (ref 0.45–4.5)
VLDLC SERPL CALC-MCNC: 27 MG/DL (ref 5–40)

## 2021-06-11 NOTE — TELEPHONE ENCOUNTER
Called pt and advised of Dr Reyes note. Verb understanding.   Pt reports that she is having lots of problems with constipation and nausea and is asking if she can have an c/s added to the egd. Advised will send message to Dr Reyes.  Verb understanding.

## 2021-06-11 NOTE — TELEPHONE ENCOUNTER
I recommend office follow up.    She also had scopes in [place] and I can't find those results, can we pls see if we can get those, thx

## 2021-06-12 LAB
BACTERIA UR CULT: NO GROWTH
BACTERIA UR CULT: NORMAL

## 2021-06-14 NOTE — TELEPHONE ENCOUNTER
Called pt and advised of Dr Reyes note. Verb understanding.   F/u made for 06/17 at 1145p with Dr Reyes.

## 2021-06-16 DIAGNOSIS — F41.9 ANXIETY: ICD-10-CM

## 2021-06-17 ENCOUNTER — OFFICE VISIT (OUTPATIENT)
Dept: GASTROENTEROLOGY | Facility: CLINIC | Age: 56
End: 2021-06-17

## 2021-06-17 VITALS
HEIGHT: 64 IN | DIASTOLIC BLOOD PRESSURE: 78 MMHG | SYSTOLIC BLOOD PRESSURE: 122 MMHG | BODY MASS INDEX: 30.05 KG/M2 | WEIGHT: 176 LBS

## 2021-06-17 DIAGNOSIS — R10.11 RUQ PAIN: Chronic | ICD-10-CM

## 2021-06-17 DIAGNOSIS — R11.0 NAUSEA: Chronic | ICD-10-CM

## 2021-06-17 DIAGNOSIS — K59.09 CHRONIC CONSTIPATION: Chronic | ICD-10-CM

## 2021-06-17 DIAGNOSIS — K21.9 GASTROESOPHAGEAL REFLUX DISEASE, UNSPECIFIED WHETHER ESOPHAGITIS PRESENT: Primary | Chronic | ICD-10-CM

## 2021-06-17 PROCEDURE — 99214 OFFICE O/P EST MOD 30 MIN: CPT | Performed by: INTERNAL MEDICINE

## 2021-06-17 RX ORDER — ESZOPICLONE 3 MG/1
TABLET, FILM COATED ORAL
Qty: 30 TABLET | Refills: 3 | Status: SHIPPED | OUTPATIENT
Start: 2021-06-17 | End: 2021-11-22

## 2021-06-17 NOTE — PROGRESS NOTES
Chief Complaint   Patient presents with   • Heartburn   • Constipation   • Nausea   • Vomiting       Subjective     HPI    Zee Reno is a 56 y.o. female with a past medical history noted below who presents for follow-up.  She has been seen previously for reflux, nausea, chronic constipation issues.     She is taking trulance for constipation which is helping some.      Feels all symptoms are worsening.  She has had extensive workup in the past but feels that because things have worsened she needs more of a work-up.  She is currently scheduled for EGD in July.  However she called office a couple of weeks ago wanting more imaging.    Last scopes were about 2 to 3 years ago in [facility].  Cannot be accessed through epic at this time so we will have my nurses try to get those records    Her weight is stable, she is overweight.  She is taking pantoprazole but is not feeling that it helps.  She says she feels nauseated and bloated all the time.  She does have a tender area in the right side of her abdomen.  It is subcutaneous.  It feels more like a lipoma but she is very concerned about this.  Says she gets bloated in her abdomen distends as the day goes on.    Past work-up has including imaging, scopes.  From what I can tell, she has had about 3 colonoscopies in the past 10 years.      Today's visit was in the office.  Both the patient and I were wearing face masks and proper hand hygiene was performed before and after the physical exam.           Current Outpatient Medications:   •  acyclovir (ZOVIRAX) 400 MG tablet, TAKE 1 BY MOUTH TWICE A DAY, Disp: , Rfl: 3  •  acyclovir (ZOVIRAX) 5 % ointment, Apply  topically to the appropriate area as directed Daily., Disp: 30 g, Rfl: 2  •  citalopram (CeleXA) 20 MG tablet, Take 1 tablet by mouth Daily., Disp: 90 tablet, Rfl: 3  •  eszopiclone (LUNESTA) 3 MG tablet, TAKE 1 TABLET BY MOUTH EVERY NIGHT AT BEDTIME, Disp: 30 tablet, Rfl: 3  •  Mirabegron ER (Myrbetriq) 25 MG  tablet sustained-release 24 hour 24 hr tablet, Take 1 tablet by mouth Daily., Disp: 14 tablet, Rfl: 0  •  pantoprazole (PROTONIX) 20 MG EC tablet, Take 1 tablet by mouth Every Morning Before Breakfast., Disp: 30 tablet, Rfl: 3  •  Plecanatide (Trulance) 3 MG tablet, Take 1 tablet by mouth Every Morning., Disp: 30 tablet, Rfl: 3  •  rosuvastatin (Crestor) 5 MG tablet, Take 1 tablet by mouth Daily., Disp: 90 tablet, Rfl: 3  •  SYNTHROID 150 MCG tablet, Take 1 tablet by mouth Daily., Disp: 90 tablet, Rfl: 3  •  valACYclovir (VALTREX) 500 MG tablet, Take 1 tablet by mouth Daily., Disp: 30 tablet, Rfl: 2      Objective     Vitals:    06/17/21 1213   BP: 122/78         06/17/21  1213   Weight: 79.8 kg (176 lb)     Body mass index is 30.21 kg/m².    Physical Exam  Constitutional:       Appearance: Normal appearance. She is obese.   Pulmonary:      Effort: Pulmonary effort is normal.   Abdominal:      Comments: Obese abdomen, palpable fatty lumps in the subcutaneous tissue of her abdomen   Neurological:      Mental Status: She is alert and oriented to person, place, and time.   Psychiatric:         Mood and Affect: Mood normal.         Behavior: Behavior normal.         Thought Content: Thought content normal.         Judgment: Judgment normal.             WBC   Date Value Ref Range Status   10/05/2020 3.05 (L) 3.40 - 10.80 10*3/mm3 Final   08/27/2020 4.56 3.40 - 10.80 10*3/mm3 Final     RBC   Date Value Ref Range Status   10/05/2020 4.29 3.77 - 5.28 10*6/mm3 Final   08/27/2020 4.57 3.77 - 5.28 10*6/mm3 Final     Hemoglobin   Date Value Ref Range Status   10/05/2020 13.1 12.0 - 15.9 g/dL Final     Hematocrit   Date Value Ref Range Status   10/05/2020 38.7 34.0 - 46.6 % Final     MCV   Date Value Ref Range Status   10/05/2020 90.2 79.0 - 97.0 fL Final     MCH   Date Value Ref Range Status   10/05/2020 30.5 26.6 - 33.0 pg Final     MCHC   Date Value Ref Range Status   10/05/2020 33.9 31.5 - 35.7 g/dL Final     RDW   Date  Value Ref Range Status   10/05/2020 12.3 12.3 - 15.4 % Final     RDW-SD   Date Value Ref Range Status   10/05/2020 40.3 37.0 - 54.0 fl Final     MPV   Date Value Ref Range Status   10/05/2020 9.7 6.0 - 12.0 fL Final     Platelets   Date Value Ref Range Status   10/05/2020 204 140 - 450 10*3/mm3 Final     Neutrophil %   Date Value Ref Range Status   10/05/2020 47.8 42.7 - 76.0 % Final     Lymphocyte %   Date Value Ref Range Status   10/05/2020 36.7 19.6 - 45.3 % Final     Monocyte %   Date Value Ref Range Status   10/05/2020 11.5 5.0 - 12.0 % Final     Eosinophil %   Date Value Ref Range Status   10/05/2020 3.3 0.3 - 6.2 % Final     Basophil %   Date Value Ref Range Status   10/05/2020 0.7 0.0 - 1.5 % Final     Immature Grans %   Date Value Ref Range Status   10/05/2020 0.0 0.0 - 0.5 % Final     Neutrophils, Absolute   Date Value Ref Range Status   10/05/2020 1.46 (L) 1.70 - 7.00 10*3/mm3 Final     Lymphocytes, Absolute   Date Value Ref Range Status   10/05/2020 1.12 0.70 - 3.10 10*3/mm3 Final     Monocytes, Absolute   Date Value Ref Range Status   10/05/2020 0.35 0.10 - 0.90 10*3/mm3 Final     Eosinophils, Absolute   Date Value Ref Range Status   10/05/2020 0.10 0.00 - 0.40 10*3/mm3 Final     Basophils, Absolute   Date Value Ref Range Status   10/05/2020 0.02 0.00 - 0.20 10*3/mm3 Final     Immature Grans, Absolute   Date Value Ref Range Status   10/05/2020 0.00 0.00 - 0.05 10*3/mm3 Final     nRBC   Date Value Ref Range Status   10/05/2020 0.0 0.0 - 0.2 /100 WBC Final       Lab Results   Component Value Date    GLUCOSE 97 10/05/2020    BUN 10 06/10/2021    CREATININE 0.80 06/10/2021    EGFRIFNONA 84 06/10/2021    EGFRIFAFRI 97 06/10/2021    BCR 13 06/10/2021    CO2 23 06/10/2021    CALCIUM 9.4 06/10/2021    PROTENTOTREF 6.6 06/10/2021    ALBUMIN 4.6 06/10/2021    LABIL2 2.3 (H) 06/10/2021    AST 19 06/10/2021    ALT 25 06/10/2021       Esophagram    IMPRESSION:  Small sliding hiatal hernia in the supine position.      Otherwise the double contrast esophagram appears normal.     Fluoroscopy time was 0.9 minutes     45 images were obtained     This report was finalized on 4/2/2021 3:38 PM by Dr. Maynor Jackson MD.    I personally reviewed data as detailed below:     The labs listed above.    The radiology studies listed above.      No notes on file    Assessment/Plan    1.  GERD: She is on PPI    2.  Right upper quadrant pain: I suspect this is more related to the subcutaneous likely lipoma than anything    3.  Nausea: Chronic issue.  She remains obese.  No vomiting    4.  Chronic constipation: Doing a little bit better with Trulance    Plan  She is quite adamant that her chronic GI symptoms, for which she has had extensive work-up in the past are worsening  She is scheduled for EGD in July and will proceed with that  I do think it is reasonable given that she is saying her symptoms are worsening to check CT imaging of the abdomen and pelvis  Will see if my nurses are able to get records from her prior scopes and imaging that were done a few years ago in [place]  Will determine whether or not she needs to repeat her colonoscopy; again, she has had about 3 scopes in the past 10 years for the same symptoms and we discussed that chronic constipation is a motility issue and that she has not had any significant abnormality that would likely change if we were to repeat  Continue Trulance  Continue PPI    Diagnoses and all orders for this visit:    1. Gastroesophageal reflux disease, unspecified whether esophagitis present (Primary)    2. RUQ pain  -     CT Abdomen Pelvis With Contrast; Future    3. Nausea  -     CT Abdomen Pelvis With Contrast; Future    4. Chronic constipation        I have discussed the above plan with the patient.  They verbalize understanding and are in agreement with the plan.  They have been advised to contact the office for any questions, concerns, or changes related to their health.    Dictated utilizing Dragon  dictation

## 2021-06-18 ENCOUNTER — TELEPHONE (OUTPATIENT)
Dept: GASTROENTEROLOGY | Facility: CLINIC | Age: 56
End: 2021-06-18

## 2021-06-18 ENCOUNTER — OFFICE VISIT (OUTPATIENT)
Dept: FAMILY MEDICINE CLINIC | Facility: CLINIC | Age: 56
End: 2021-06-18

## 2021-06-18 VITALS
RESPIRATION RATE: 16 BRPM | HEART RATE: 87 BPM | SYSTOLIC BLOOD PRESSURE: 120 MMHG | WEIGHT: 176.4 LBS | BODY MASS INDEX: 30.11 KG/M2 | OXYGEN SATURATION: 98 % | TEMPERATURE: 97.8 F | DIASTOLIC BLOOD PRESSURE: 80 MMHG | HEIGHT: 64 IN

## 2021-06-18 DIAGNOSIS — K21.9 GASTROESOPHAGEAL REFLUX DISEASE, UNSPECIFIED WHETHER ESOPHAGITIS PRESENT: ICD-10-CM

## 2021-06-18 DIAGNOSIS — K58.1 IRRITABLE BOWEL SYNDROME WITH CONSTIPATION: ICD-10-CM

## 2021-06-18 DIAGNOSIS — N32.89 BLADDER IRRITABILITY: ICD-10-CM

## 2021-06-18 DIAGNOSIS — E78.5 HYPERLIPIDEMIA, UNSPECIFIED HYPERLIPIDEMIA TYPE: Primary | ICD-10-CM

## 2021-06-18 DIAGNOSIS — E03.9 ACQUIRED HYPOTHYROIDISM: ICD-10-CM

## 2021-06-18 PROCEDURE — 99214 OFFICE O/P EST MOD 30 MIN: CPT | Performed by: INTERNAL MEDICINE

## 2021-06-18 RX ORDER — ROSUVASTATIN CALCIUM 5 MG/1
5 TABLET, COATED ORAL DAILY
Qty: 90 TABLET | Refills: 3 | Status: SHIPPED | OUTPATIENT
Start: 2021-06-18 | End: 2022-05-19

## 2021-06-18 NOTE — PROGRESS NOTES
Javier Reno is a 56 y.o. female. Patient is here today for follow-up on lab  results and her bladder symptoms.  Since I saw the patient last she is seen GI and is scheduled for a CT scan of the abdomen as well as EGD.  She started on Myrbetriq and her bladder symptoms are improved thus far and she has had no side effects.  Chief Complaint   Patient presents with   • Hyperlipidemia     HYPOTHYROIDISM, URINARY FREQUENCY- PT HERE FOR FOLLOW UP LABS          Vitals:    06/18/21 1254   BP: 120/80   Pulse: 87   Resp: 16   Temp: 97.8 °F (36.6 °C)   SpO2: 98%     Body mass index is 30.26 kg/m².  The following portions of the patient's history were reviewed and updated as appropriate: allergies, current medications, past family history, past medical history, past social history, past surgical history and problem list.    Past Medical History:   Diagnosis Date   • Abdominal pain 07/08/2002    NEGATIVE ABDOMINAL SERIES, SEEN AT [facility] ER   • Adenomyosis    • Adjustment disorder with mixed anxiety and depressed mood    • Allergic rhinitis    • Anxiety    • Breast injury     fell and bruised rt breast mamny years.   • Colon polyps    • Depression    • Dermatitis 12/10/2014   • Disease of thyroid gland     HYPOTHYROIDISM   • Dyspareunia, female    • Erosive esophagitis 01/16/2003    DR. LEWIS EGD   • Eustachian tube disorder     BILATERAL   • Gastritis 01/16/2003    MILD, DR. LEWIS EGD   • Generalized anxiety disorder    • Genital herpes simplex    • History of menorrhagia    • Irritable bowel syndrome with constipation    • Left leg pain 11/08/2007    MRI OF LUMBAR SPINE AT [facility] WAS WNL   • Melanosis coli 01/16/2003    DR. [facility]UDINE [facility] CY   • Menopausal state    • Migraine    • Ovarian cyst 03/07/2002    CT SCAN AT [facility] FOR 3CM LEFT OVARIAN CYST   • Right sided abdominal pain 04/02/2002    CT SCAN SHOWED TRACE AMT OF FREE FLUID IN RIGHT HEMIPELVIS, UTERUS AND ADNEXA APPEAR UNREMARKABLE, TINY FOLLICLES WITHIN THE RIGHT  OVARY   • Sliding hiatal hernia 01/16/2003     EGD       Allergies   Allergen Reactions   • Codeine    • Keflex [Cephalexin]       Social History     Socioeconomic History   • Marital status:      Spouse name: Not on file   • Number of children: Not on file   • Years of education: Not on file   • Highest education level: Not on file   Tobacco Use   • Smoking status: Never Smoker   • Smokeless tobacco: Never Used   Substance and Sexual Activity   • Alcohol use: Not Currently     Alcohol/week: 0.0 standard drinks     Comment: SOCIAL   • Drug use: No   • Sexual activity: Not Currently     Partners: Male     Birth control/protection: Surgical        Current Outpatient Medications:   •  acyclovir (ZOVIRAX) 400 MG tablet, TAKE 1 BY MOUTH TWICE A DAY, Disp: , Rfl: 3  •  acyclovir (ZOVIRAX) 5 % ointment, Apply  topically to the appropriate area as directed Daily., Disp: 30 g, Rfl: 2  •  citalopram (CeleXA) 20 MG tablet, Take 1 tablet by mouth Daily., Disp: 90 tablet, Rfl: 3  •  eszopiclone (LUNESTA) 3 MG tablet, TAKE 1 TABLET BY MOUTH EVERY NIGHT AT BEDTIME, Disp: 30 tablet, Rfl: 3  •  Mirabegron ER (Myrbetriq) 25 MG tablet sustained-release 24 hour 24 hr tablet, Take 1 tablet by mouth Daily., Disp: 30 tablet, Rfl: 5  •  pantoprazole (PROTONIX) 20 MG EC tablet, Take 1 tablet by mouth Every Morning Before Breakfast., Disp: 30 tablet, Rfl: 3  •  Plecanatide (Trulance) 3 MG tablet, Take 1 tablet by mouth Every Morning., Disp: 30 tablet, Rfl: 3  •  rosuvastatin (Crestor) 5 MG tablet, Take 1 tablet by mouth Daily., Disp: 90 tablet, Rfl: 3  •  SYNTHROID 150 MCG tablet, Take 1 tablet by mouth Daily., Disp: 90 tablet, Rfl: 3  •  valACYclovir (VALTREX) 500 MG tablet, Take 1 tablet by mouth Daily., Disp: 30 tablet, Rfl: 2     Objective     History of Present Illness     Review of Systems   Constitutional: Negative.    HENT: Negative.    Respiratory: Negative.    Cardiovascular: Negative.    Gastrointestinal: Negative.     Genitourinary: Negative.    Musculoskeletal: Negative.    Skin: Negative.    Neurological: Negative.    Hematological: Negative.    Psychiatric/Behavioral: Negative.        Physical Exam  Vitals and nursing note reviewed.   Constitutional:       General: She is not in acute distress.     Appearance: Normal appearance. She is not ill-appearing.   HENT:      Head: Normocephalic and atraumatic.   Eyes:      General: No scleral icterus.     Conjunctiva/sclera: Conjunctivae normal.   Cardiovascular:      Rate and Rhythm: Normal rate and regular rhythm.      Heart sounds: Normal heart sounds.   Pulmonary:      Effort: Pulmonary effort is normal. No respiratory distress.      Breath sounds: Normal breath sounds. No wheezing or rales.   Musculoskeletal:         General: Normal range of motion.      Cervical back: Normal range of motion and neck supple.   Skin:     General: Skin is warm and dry.   Neurological:      General: No focal deficit present.      Mental Status: She is alert and oriented to person, place, and time.   Psychiatric:         Mood and Affect: Mood normal.         Behavior: Behavior normal.         ASSESSMENT urinalysis was clear.  Urine culture was no growth.  CMP was completely normal.  TSH was slightly low but stable and free T4 was quite normal and clinically patient is euthyroid.  Lipid panel is elevated with total cholesterol 210, HDL 39,   #1-hyperlipidemia, not controlled by diet  #2-irritable bladder syndrome, thus far improved on Myrbetriq  #3-hypothyroidism, euthyroid on replacement  #4-irritable bowel syndrome with upper abdominal pain, GI work-up in progress     Problems Addressed this Visit        Cardiac and Vasculature    Hyperlipidemia - Primary    Relevant Medications    rosuvastatin (Crestor) 5 MG tablet       Endocrine and Metabolic    Hypothyroidism       Gastrointestinal Abdominal     Irritable bowel syndrome with constipation    Gastroesophageal reflux disease        Genitourinary and Reproductive     Bladder irritability      Diagnoses       Codes Comments    Hyperlipidemia, unspecified hyperlipidemia type    -  Primary ICD-10-CM: E78.5  ICD-9-CM: 272.4     Acquired hypothyroidism     ICD-10-CM: E03.9  ICD-9-CM: 244.9     Irritable bowel syndrome with constipation     ICD-10-CM: K58.1  ICD-9-CM: 564.1     Gastroesophageal reflux disease, unspecified whether esophagitis present     ICD-10-CM: K21.9  ICD-9-CM: 530.81     Bladder irritability     ICD-10-CM: N32.89  ICD-9-CM: 596.89           PLAN the patient will restart Crestor 5 mg daily and continue the Myrbetriq 25 mg daily with the option to increase it to 50 mg if she thinks she needs to.  She will continue other medicines as now.  I would like to recheck her in 6 to 8 weeks with a CMP, lipid panel    There are no Patient Instructions on file for this visit.  Return in about 6 weeks (around 7/30/2021) for with labs.

## 2021-06-18 NOTE — TELEPHONE ENCOUNTER
----- Message from Cinda Reyes MD sent at 6/17/2021 12:35 PM EDT -----  Can we get records from her imaging and scopes from [facility], not showing up in [facility], [facility]

## 2021-06-18 NOTE — TELEPHONE ENCOUNTER
Called pt and pt reports that her scopes were done with Dr Hoff # [phone].  Pt states another number we may need is [facility]  [phone].  She states she feels like Dr Hoff 's office would have all the records.      Called Dr Garcia' office at number above and spoke with Christina and was advised to fax request to them at [phone].    Request faxed to this number.

## 2021-06-22 NOTE — TELEPHONE ENCOUNTER
Called Dr Hoff's office and was on hold for 10min with no success.     Called [facility] at [phone] and spoke with Christianne and she reports that they only have a ct scan from 2019 and we need to fax the request to them .  Request faxed to them at [phone].

## 2021-06-29 ENCOUNTER — TRANSCRIBE ORDERS (OUTPATIENT)
Dept: SLEEP MEDICINE | Facility: HOSPITAL | Age: 56
End: 2021-06-29

## 2021-06-29 DIAGNOSIS — Z01.818 OTHER SPECIFIED PRE-OPERATIVE EXAMINATION: Primary | ICD-10-CM

## 2021-06-29 RX ORDER — VALACYCLOVIR HYDROCHLORIDE 500 MG/1
500 TABLET, FILM COATED ORAL DAILY
Qty: 90 TABLET | Refills: 1 | Status: SHIPPED | OUTPATIENT
Start: 2021-06-29 | End: 2022-05-19 | Stop reason: SDUPTHER

## 2021-07-09 ENCOUNTER — HOSPITAL ENCOUNTER (OUTPATIENT)
Dept: CT IMAGING | Facility: HOSPITAL | Age: 56
Discharge: HOME | End: 2021-07-09
Admitting: INTERNAL MEDICINE

## 2021-07-09 DIAGNOSIS — R11.0 NAUSEA: Chronic | ICD-10-CM

## 2021-07-09 DIAGNOSIS — R10.11 RUQ PAIN: Chronic | ICD-10-CM

## 2021-07-09 PROCEDURE — 0 DIATRIZOATE MEGLUMINE & SODIUM PER 1 ML: Performed by: INTERNAL MEDICINE

## 2021-07-09 PROCEDURE — 74177 CT ABD & PELVIS W/CONTRAST: CPT

## 2021-07-09 PROCEDURE — 25010000002 IOPAMIDOL 61 % SOLUTION: Performed by: INTERNAL MEDICINE

## 2021-07-09 RX ADMIN — DIATRIZOATE MEGLUMINE AND DIATRIZOATE SODIUM 30 ML: 660; 100 LIQUID ORAL; RECTAL at 07:15

## 2021-07-09 RX ADMIN — IOPAMIDOL 85 ML: 612 INJECTION, SOLUTION INTRAVENOUS at 08:38

## 2021-07-09 NOTE — PROGRESS NOTES
The CT scan of the abdomen shows that she has had a hysterectomy.  There is evidence of a small hiatal hernia which is very common and normal.  The CT is otherwise normal.

## 2021-07-16 ENCOUNTER — TELEPHONE (OUTPATIENT)
Dept: GASTROENTEROLOGY | Facility: CLINIC | Age: 56
End: 2021-07-16

## 2021-07-16 ENCOUNTER — APPOINTMENT (OUTPATIENT)
Dept: LAB | Facility: HOSPITAL | Age: 56
End: 2021-07-16

## 2021-07-16 ENCOUNTER — TRANSCRIBE ORDERS (OUTPATIENT)
Dept: SLEEP MEDICINE | Facility: HOSPITAL | Age: 56
End: 2021-07-16

## 2021-07-16 ENCOUNTER — LAB (OUTPATIENT)
Dept: LAB | Facility: HOSPITAL | Age: 56
End: 2021-07-16

## 2021-07-16 DIAGNOSIS — Z01.818 OTHER SPECIFIED PRE-OPERATIVE EXAMINATION: ICD-10-CM

## 2021-07-16 DIAGNOSIS — Z01.818 OTHER SPECIFIED PRE-OPERATIVE EXAMINATION: Primary | ICD-10-CM

## 2021-07-16 PROCEDURE — C9803 HOPD COVID-19 SPEC COLLECT: HCPCS

## 2021-07-16 PROCEDURE — U0004 COV-19 TEST NON-CDC HGH THRU: HCPCS

## 2021-07-16 NOTE — TELEPHONE ENCOUNTER
----- Message from Cinda Reyes MD sent at 7/9/2021  4:47 PM EDT -----  The CT scan of the abdomen shows that she has had a hysterectomy.  There is evidence of a small hiatal hernia which is very common and normal.  The CT is otherwise normal.

## 2021-07-16 NOTE — TELEPHONE ENCOUNTER
Called pt and advised of Dr Reyes note. Verb understanding.    Critical lab result received from lab. Calcium level 13.8. Dr. Vipin Chen notified. No orders at this time.

## 2021-07-17 LAB — SARS-COV-2 ORF1AB RESP QL NAA+PROBE: NOT DETECTED

## 2021-07-19 ENCOUNTER — ANESTHESIA (OUTPATIENT)
Dept: GASTROENTEROLOGY | Facility: HOSPITAL | Age: 56
End: 2021-07-19

## 2021-07-19 ENCOUNTER — HOSPITAL ENCOUNTER (OUTPATIENT)
Facility: HOSPITAL | Age: 56
Setting detail: HOSPITAL OUTPATIENT SURGERY
Discharge: HOME | End: 2021-07-19
Attending: INTERNAL MEDICINE | Admitting: INTERNAL MEDICINE

## 2021-07-19 ENCOUNTER — ANESTHESIA EVENT (OUTPATIENT)
Dept: GASTROENTEROLOGY | Facility: HOSPITAL | Age: 56
End: 2021-07-19

## 2021-07-19 VITALS
DIASTOLIC BLOOD PRESSURE: 66 MMHG | OXYGEN SATURATION: 97 % | HEIGHT: 64 IN | RESPIRATION RATE: 18 BRPM | HEART RATE: 68 BPM | SYSTOLIC BLOOD PRESSURE: 106 MMHG | BODY MASS INDEX: 30.05 KG/M2 | WEIGHT: 176 LBS

## 2021-07-19 DIAGNOSIS — R13.19 ESOPHAGEAL DYSPHAGIA: ICD-10-CM

## 2021-07-19 DIAGNOSIS — K21.9 GASTROESOPHAGEAL REFLUX DISEASE, UNSPECIFIED WHETHER ESOPHAGITIS PRESENT: ICD-10-CM

## 2021-07-19 PROCEDURE — 88305 TISSUE EXAM BY PATHOLOGIST: CPT | Performed by: INTERNAL MEDICINE

## 2021-07-19 PROCEDURE — S0260 H&P FOR SURGERY: HCPCS | Performed by: INTERNAL MEDICINE

## 2021-07-19 PROCEDURE — 25010000002 PROPOFOL 10 MG/ML EMULSION: Performed by: NURSE ANESTHETIST, CERTIFIED REGISTERED

## 2021-07-19 PROCEDURE — 43239 EGD BIOPSY SINGLE/MULTIPLE: CPT | Performed by: INTERNAL MEDICINE

## 2021-07-19 RX ORDER — PROPOFOL 10 MG/ML
VIAL (ML) INTRAVENOUS AS NEEDED
Status: DISCONTINUED | OUTPATIENT
Start: 2021-07-19 | End: 2021-07-19 | Stop reason: SURG

## 2021-07-19 RX ORDER — SODIUM CHLORIDE 0.9 % (FLUSH) 0.9 %
3 SYRINGE (ML) INJECTION EVERY 12 HOURS SCHEDULED
Status: DISCONTINUED | OUTPATIENT
Start: 2021-07-19 | End: 2021-07-19 | Stop reason: HOSPADM

## 2021-07-19 RX ORDER — SODIUM CHLORIDE 0.9 % (FLUSH) 0.9 %
10 SYRINGE (ML) INJECTION AS NEEDED
Status: DISCONTINUED | OUTPATIENT
Start: 2021-07-19 | End: 2021-07-19 | Stop reason: HOSPADM

## 2021-07-19 RX ORDER — LIDOCAINE HYDROCHLORIDE 20 MG/ML
INJECTION, SOLUTION INFILTRATION; PERINEURAL AS NEEDED
Status: DISCONTINUED | OUTPATIENT
Start: 2021-07-19 | End: 2021-07-19 | Stop reason: SURG

## 2021-07-19 RX ORDER — SODIUM CHLORIDE, SODIUM LACTATE, POTASSIUM CHLORIDE, CALCIUM CHLORIDE 600; 310; 30; 20 MG/100ML; MG/100ML; MG/100ML; MG/100ML
30 INJECTION, SOLUTION INTRAVENOUS CONTINUOUS
Status: DISCONTINUED | OUTPATIENT
Start: 2021-07-19 | End: 2021-07-19 | Stop reason: HOSPADM

## 2021-07-19 RX ORDER — PROPOFOL 10 MG/ML
VIAL (ML) INTRAVENOUS CONTINUOUS PRN
Status: DISCONTINUED | OUTPATIENT
Start: 2021-07-19 | End: 2021-07-19 | Stop reason: SURG

## 2021-07-19 RX ADMIN — PROPOFOL 200 MCG/KG/MIN: 10 INJECTION, EMULSION INTRAVENOUS at 14:31

## 2021-07-19 RX ADMIN — PROPOFOL 100 MG: 10 INJECTION, EMULSION INTRAVENOUS at 14:31

## 2021-07-19 RX ADMIN — PROPOFOL 50 MG: 10 INJECTION, EMULSION INTRAVENOUS at 14:34

## 2021-07-19 RX ADMIN — SODIUM CHLORIDE, POTASSIUM CHLORIDE, SODIUM LACTATE AND CALCIUM CHLORIDE 30 ML/HR: 600; 310; 30; 20 INJECTION, SOLUTION INTRAVENOUS at 13:44

## 2021-07-19 RX ADMIN — LIDOCAINE HYDROCHLORIDE 60 MG: 20 INJECTION, SOLUTION INFILTRATION; PERINEURAL at 14:31

## 2021-07-19 NOTE — ANESTHESIA PREPROCEDURE EVALUATION
Anesthesia Evaluation     Patient summary reviewed and Nursing notes reviewed   NPO Solid Status: > 8 hours  NPO Liquid Status: > 4 hours           Airway   Mallampati: II  Neck ROM: full  No difficulty expected  Dental      Comment: Bridges lower    Pulmonary     breath sounds clear to auscultation  Cardiovascular     Rhythm: regular    (+) hyperlipidemia,       Neuro/Psych  (+) headaches, psychiatric history Anxiety and Depression,     GI/Hepatic/Renal/Endo    (+)  hiatal hernia, GERD, PUD,      Musculoskeletal     Abdominal    Substance History      OB/GYN          Other                        Anesthesia Plan    ASA 2     MAC     intravenous induction     Anesthetic plan, all risks, benefits, and alternatives have been provided, discussed and informed consent has been obtained with: patient.

## 2021-07-19 NOTE — H&P
[facility]  Pre Procedure History & Physical    Chief Complaint: GERD, nausea      HPI: 56 y.o. female with a past medical history noted below who presents for follow-up.  She has been seen previously for reflux, nausea, chronic constipation issues.      She is taking trulance for constipation which is helping some.       Feels all symptoms are worsening.  She has had extensive workup in the past but feels that because things have worsened she needs more of a work-up.  She is currently scheduled for EGD in July.  However she called office a couple of weeks ago wanting more imaging.     Last scopes were about 2 to 3 years ago in [facility].  Cannot be accessed through epic at this time so we will have my nurses try to get those records     Her weight is stable, she is overweight.  She is taking pantoprazole but is not feeling that it helps.  She says she feels nauseated and bloated all the time.  She does have a tender area in the right side of her abdomen.  It is subcutaneous.  It feels more like a lipoma but she is very concerned about this.  Says she gets bloated in her abdomen distends as the day goes on.     Past work-up has including imaging, scopes.  From what I can tell, she has had about 3 colonoscopies in the past 10 years.    Past Medical History:   Past Medical History:   Diagnosis Date   • Abdominal pain 07/08/2002    NEGATIVE ABDOMINAL SERIES, SEEN AT [facility] ER   • Adenomyosis    • Adjustment disorder with mixed anxiety and depressed mood    • Allergic rhinitis    • Anxiety    • Breast injury     fell and bruised rt breast mamny years.   • Colon polyps    • Depression    • Dermatitis 12/10/2014   • Disease of thyroid gland     HYPOTHYROIDISM   • Dyspareunia, female    • Erosive esophagitis 01/16/2003    DR. LEWIS EGD   • Eustachian tube disorder     BILATERAL   • Gastritis 01/16/2003    MILD, DR. LEWIS EGD   • Generalized anxiety disorder    • Genital herpes simplex    •  History of menorrhagia    • Irritable bowel syndrome with constipation    • Left leg pain 11/08/2007    MRI OF LUMBAR SPINE AT [facility] WAS WNL   • Melanosis coli 01/16/2003    DR. CLAUDINE LEWIS CY   • Menopausal state    • Migraine    • Ovarian cyst 03/07/2002    CT SCAN AT [facility] FOR 3CM LEFT OVARIAN CYST   • Right sided abdominal pain 04/02/2002    CT SCAN SHOWED TRACE AMT OF FREE FLUID IN RIGHT HEMIPELVIS, UTERUS AND ADNEXA APPEAR UNREMARKABLE, TINY FOLLICLES WITHIN THE RIGHT OVARY   • Sliding hiatal hernia 01/16/2003     EGD        Family History:  Family History   Problem Relation Age of Onset   • Alzheimer's disease Mother    • Hypertension Mother    • Heart disease Maternal Grandfather    • No Known Problems Son    • Alzheimer's disease Maternal Grandmother    • No Known Problems Son    • Colon polyps Paternal Grandfather        Social History:   reports that she has never smoked. She has never used smokeless tobacco. She reports previous alcohol use. She reports that she does not use drugs.    Medications:   No medications prior to admission.       Allergies:  Codeine and Keflex [cephalexin]    ROS:    Pertinent items are noted in HPI     Objective     not currently breastfeeding.    Physical Exam   Constitutional: Pt is oriented to person, place, and time and well-developed, well-nourished, and in no distress.   HENT:   Mouth/Throat: Oropharynx is clear and moist.   Neck: Normal range of motion. Neck supple.   Cardiovascular: Normal rate, regular rhythm and normal heart sounds.    Pulmonary/Chest: Effort normal and breath sounds normal. No respiratory distress. No  wheezes.   Abdominal: Soft. Bowel sounds are normal.   Skin: Skin is warm and dry.   Psychiatric: Mood, memory, affect and judgment normal.     Assessment/Plan     Diagnosis: GERD, nausea      Anticipated Surgical Procedure:  EGD    The risks, benefits, and alternatives of this procedure have been discussed with the patient or the responsible  party- the patient understands and agrees to proceed.

## 2021-07-19 NOTE — ANESTHESIA POSTPROCEDURE EVALUATION
Patient: Zee Reno    Procedure Summary     Date: 07/19/21 Room / Location:  [facility] /  [facility]    Anesthesia Start: 1426 Anesthesia Stop: 1451    Procedure: ESOPHAGOGASTRODUODENOSCOPY with cold biopsies (N/A Esophagus) Diagnosis:       Gastroesophageal reflux disease, unspecified whether esophagitis present      Esophageal dysphagia      (Gastroesophageal reflux disease, unspecified whether esophagitis present [K21.9])      (Esophageal dysphagia [R13.10])    Surgeons: Cinda Reyes MD Provider: Srinivas Hampton MD    Anesthesia Type: MAC ASA Status: 2          Anesthesia Type: MAC    Vitals  Vitals Value Taken Time   /66 07/19/21 1508   Temp     Pulse 68 07/19/21 1508   Resp 18 07/19/21 1508   SpO2 97 % 07/19/21 1508           Post Anesthesia Care and Evaluation    Patient location during evaluation: bedside  Patient participation: complete - patient participated  Level of consciousness: awake  Pain management: adequate  Airway patency: patent  Anesthetic complications: No anesthetic complications    Cardiovascular status: acceptable  Respiratory status: acceptable  Hydration status: acceptable

## 2021-07-20 LAB
LAB AP CASE REPORT: NORMAL
LAB AP DIAGNOSIS COMMENT: NORMAL
PATH REPORT.FINAL DX SPEC: NORMAL
PATH REPORT.GROSS SPEC: NORMAL

## 2021-07-21 RX ORDER — FLUTICASONE PROPIONATE 220 UG/1
AEROSOL, METERED RESPIRATORY (INHALATION)
Qty: 2 EACH | Refills: 3 | Status: SHIPPED | OUTPATIENT
Start: 2021-07-21 | End: 2022-05-19

## 2021-07-21 NOTE — PROGRESS NOTES
Biopsies from her EGD show mild reflux esophagitis, mild inflammation of the small bowel, evidence of eosinophilic esophagitis.    I recommend she continue the pantoprazoleI am ordering fluticasone spray for her to take as directed-as she will end up spreading this into her mouth and swallowing it to help settle down the eosinophilic esophagitis which is likely contributing to a lot of her symptoms.

## 2021-07-22 ENCOUNTER — TELEPHONE (OUTPATIENT)
Dept: GASTROENTEROLOGY | Facility: CLINIC | Age: 56
End: 2021-07-22

## 2021-07-22 NOTE — TELEPHONE ENCOUNTER
----- Message from Serena Holland Rep sent at 7/22/2021 11:48 AM EDT -----  Regarding: Questions  Contact: [phone]  Pt has more questions

## 2021-07-22 NOTE — TELEPHONE ENCOUNTER
----- Message from Cinda Reyes MD sent at 7/21/2021 12:08 PM EDT -----  Biopsies from her EGD show mild reflux esophagitis, mild inflammation of the small bowel, evidence of eosinophilic esophagitis.    I recommend she continue the pantoprazoleI am ordering fluticasone spray for her to take as directed-as she will end up spreading this into her mouth and swallowing it to help settle down the eosinophilic esophagitis which is likely contributing to a lot of her symptoms.

## 2021-07-29 RX ORDER — CITALOPRAM 20 MG/1
20 TABLET ORAL DAILY
Qty: 90 TABLET | Refills: 3 | Status: SHIPPED | OUTPATIENT
Start: 2021-07-29 | End: 2022-05-19 | Stop reason: SDUPTHER

## 2021-07-30 DIAGNOSIS — E78.5 HYPERLIPIDEMIA, UNSPECIFIED HYPERLIPIDEMIA TYPE: Primary | ICD-10-CM

## 2021-08-31 ENCOUNTER — TELEPHONE (OUTPATIENT)
Dept: GASTROENTEROLOGY | Facility: CLINIC | Age: 56
End: 2021-08-31

## 2021-08-31 RX ORDER — PANTOPRAZOLE SODIUM 20 MG/1
20 TABLET, DELAYED RELEASE ORAL
Qty: 90 TABLET | Refills: 1 | Status: SHIPPED | OUTPATIENT
Start: 2021-08-31 | End: 2022-02-14

## 2021-08-31 NOTE — TELEPHONE ENCOUNTER
"Faxed request received from [facility] [employer] [facility] for pantoprazole 20 mg 1 tab po daily, #90.     See note of 7/22/21: \"I recommend she continue the pantoprazoleI am \"    Escribe completed as above.  R1.    "

## 2021-09-09 ENCOUNTER — TELEPHONE (OUTPATIENT)
Dept: FAMILY MEDICINE CLINIC | Facility: CLINIC | Age: 56
End: 2021-09-09

## 2021-09-09 NOTE — TELEPHONE ENCOUNTER
Caller: Zee Reno    Relationship to patient: Self    Best call back number: [phone]    Chief complaint: COVID SYMPTOMS-CONGESTION, HEADACHES. WAS TAKING MEDS FOR SINUS INFECTIONS, FINISHED MEDS AND STILL HAVING ALL THE SYMPTOMS.     Type of visit: COVID TEST    Requested date: ASAP    Additional notes: ATTEMPTED WARM TRANSFER, NO ANSWER, PLEASE CALL TO SCHEDULE. NO SAME DAY OR MYCHART VISITS AVAILABLE TODAY.

## 2021-09-10 ENCOUNTER — TELEPHONE (OUTPATIENT)
Dept: FAMILY MEDICINE CLINIC | Facility: CLINIC | Age: 56
End: 2021-09-10

## 2021-09-10 NOTE — TELEPHONE ENCOUNTER
PATIENT IS CALLING AND STATES THAT SHE ISN'T FEELING WELL AND WONDERS IF SHE CAN BE TESTED FOR COVID.  SHE WAS AROUND HER SON LAST WEEK WHO HAS TESTED POSITIVE.  PLEASE ADVISE @ [phone].

## 2021-09-29 RX ORDER — PANTOPRAZOLE SODIUM 20 MG/1
20 TABLET, DELAYED RELEASE ORAL
Qty: 90 TABLET | Refills: 1 | OUTPATIENT
Start: 2021-09-29

## 2021-10-06 ENCOUNTER — PATIENT MESSAGE (OUTPATIENT)
Dept: FAMILY MEDICINE CLINIC | Facility: CLINIC | Age: 56
End: 2021-10-06

## 2021-10-06 DIAGNOSIS — E03.9 ACQUIRED HYPOTHYROIDISM: Primary | ICD-10-CM

## 2021-10-07 ENCOUNTER — TELEPHONE (OUTPATIENT)
Dept: FAMILY MEDICINE CLINIC | Facility: CLINIC | Age: 56
End: 2021-10-07

## 2021-10-07 RX ORDER — LEVOTHYROXINE SODIUM 150 MCG
150 TABLET ORAL DAILY
Qty: 90 TABLET | Refills: 3 | Status: SHIPPED | OUTPATIENT
Start: 2021-10-07 | End: 2022-05-19

## 2021-10-07 RX ORDER — LEVOTHYROXINE SODIUM 150 MCG
150 TABLET ORAL DAILY
Qty: 90 TABLET | Refills: 3 | Status: CANCELLED | OUTPATIENT
Start: 2021-10-07

## 2021-10-07 RX ORDER — LEVOTHYROXINE SODIUM 150 MCG
150 TABLET ORAL DAILY
Qty: 90 TABLET | Refills: 3 | Status: SHIPPED | OUTPATIENT
Start: 2021-10-07 | End: 2021-10-07 | Stop reason: SDUPTHER

## 2021-10-07 NOTE — TELEPHONE ENCOUNTER
From: Zee Reno  To: Davidson Buenrostro MD  Sent: 10/6/2021 12:47 PM EDT  Subject: Referral Request    Hi Dr. Buenrostro,     My thyroid medication is coming due for a refill and I need to get that refilled but I also want to see if you could refer me to an endocrinologist because I'd like to see if they can look at my levels... Thanks for your help     Zee Reno

## 2021-10-07 NOTE — TELEPHONE ENCOUNTER
Caller: Zee Reno    Relationship: Self      Medication requested (name and dosage): Synthroid 150 MCG tablet - PLEASE CALL PATIENT WHEN MEDICATION IS SENT     Pharmacy where request should be sent: [facility] - [place], [place] - [address] [place], [place] [address] - [phone]  - [phone]   [phone]    Additional details provided by patient: PATIENT HAS A WEEK LEFT AND SHE STATED IT WAS SENT TO [facility] IN ERROR AND NEEDS IT SENT  TO [place] .     Best call back number: [phone]     Does the patient have less than a 3 day supply:  [] Yes  [x] No    Serena Zapata Rep   10/07/21 11:44 EDT

## 2021-11-21 DIAGNOSIS — F41.9 ANXIETY: ICD-10-CM

## 2021-11-22 NOTE — TELEPHONE ENCOUNTER
Rx Refill Note  Requested Prescriptions     Pending Prescriptions Disp Refills   • eszopiclone (LUNESTA) 3 MG tablet [Pharmacy Med Name: ESZOPICLONE 3MG TABLETS] 30 tablet      Sig: TAKE 1 TABLET BY MOUTH EVERY NIGHT AT BEDTIME      Last office visit with prescribing clinician: 6/18/2021      Next office visit with prescribing clinician: Visit date not found            Valeyr Cuellar MA  11/22/21, 12:52 EST

## 2021-11-23 RX ORDER — ESZOPICLONE 3 MG/1
TABLET, FILM COATED ORAL
Qty: 30 TABLET | Refills: 0 | Status: SHIPPED | OUTPATIENT
Start: 2021-11-23 | End: 2022-02-18

## 2022-01-05 DIAGNOSIS — Z12.31 SCREENING MAMMOGRAM FOR BREAST CANCER: Primary | ICD-10-CM

## 2022-01-28 ENCOUNTER — TELEPHONE (OUTPATIENT)
Dept: FAMILY MEDICINE CLINIC | Facility: CLINIC | Age: 57
End: 2022-01-28

## 2022-01-28 ENCOUNTER — OFFICE VISIT (OUTPATIENT)
Dept: FAMILY MEDICINE CLINIC | Facility: CLINIC | Age: 57
End: 2022-01-28

## 2022-01-28 DIAGNOSIS — R05.9 COUGH: ICD-10-CM

## 2022-01-28 DIAGNOSIS — U07.1 COVID-19 VIRUS INFECTION: Primary | ICD-10-CM

## 2022-01-28 DIAGNOSIS — R11.0 NAUSEA: ICD-10-CM

## 2022-01-28 PROBLEM — K21.9 GASTROESOPHAGEAL REFLUX DISEASE: Status: ACTIVE | Noted: 2018-08-31

## 2022-01-28 PROBLEM — K44.9 HIATAL HERNIA: Status: ACTIVE | Noted: 2018-08-31

## 2022-01-28 PROBLEM — K22.10 EROSIVE ESOPHAGITIS: Status: ACTIVE | Noted: 2018-04-24

## 2022-01-28 PROBLEM — R00.2 PALPITATIONS: Status: ACTIVE | Noted: 2018-04-11

## 2022-01-28 PROBLEM — R06.00 DYSPNEA: Status: ACTIVE | Noted: 2018-04-11

## 2022-01-28 PROBLEM — R63.5 ABNORMAL WEIGHT GAIN: Status: ACTIVE | Noted: 2018-10-30

## 2022-01-28 PROBLEM — Z87.891 HISTORY OF TOBACCO USE: Status: ACTIVE | Noted: 2017-11-09

## 2022-01-28 PROCEDURE — 99443 PR PHYS/QHP TELEPHONE EVALUATION 21-30 MIN: CPT | Performed by: NURSE PRACTITIONER

## 2022-01-28 RX ORDER — DEXTROMETHORPHAN HYDROBROMIDE AND PROMETHAZINE HYDROCHLORIDE 15; 6.25 MG/5ML; MG/5ML
5 SYRUP ORAL 3 TIMES DAILY PRN
Qty: 150 ML | Refills: 0 | Status: SHIPPED | OUTPATIENT
Start: 2022-01-28 | End: 2022-05-19

## 2022-01-28 RX ORDER — ONDANSETRON 4 MG/1
4 TABLET, FILM COATED ORAL EVERY 8 HOURS PRN
Qty: 12 TABLET | Refills: 0 | Status: SHIPPED | OUTPATIENT
Start: 2022-01-28 | End: 2022-05-19

## 2022-01-28 RX ORDER — METHYLPREDNISOLONE 4 MG/1
TABLET ORAL
Qty: 1 EACH | Refills: 0 | Status: SHIPPED | OUTPATIENT
Start: 2022-01-28 | End: 2022-05-19

## 2022-01-28 RX ORDER — BROMPHENIRAMINE MALEATE, PSEUDOEPHEDRINE HYDROCHLORIDE, AND DEXTROMETHORPHAN HYDROBROMIDE 2; 30; 10 MG/5ML; MG/5ML; MG/5ML
SYRUP ORAL
COMMUNITY
Start: 2021-12-19 | End: 2022-05-19

## 2022-01-28 NOTE — PATIENT INSTRUCTIONS
Drink plenty of fluids-water preferably, eat a heart healthy diet, get plenty of sleep and do warm salt water gargles twice a day until feeling better; Recommend pt to stay in quarantine until 1/30/22 and if still having symptoms to stay in quarantine for another 5 days. Discussed different treatment options for covid at this time. Pt declining Molnupiravir at this time.

## 2022-01-28 NOTE — TELEPHONE ENCOUNTER
Caller: Zee Reno    Relationship: Self    Best call back number: [phone]    What medication are you requesting: Z PACK   What are your current symptoms: CONGESTION, FEVER, COUGH, BODY ACHES     How long have you been experiencing symptoms: 2-3 DAYS     If a prescription is needed, what is your preferred pharmacy and phone number: [id number] [address] AT [facility] - [phone] [facility] [phone] FX     Additional notes: PATIENT IS POSITIVE FOR COVID       ALSO HAD COVID VACCINE ON 9/15/21 AND 1/11/22 ALFRED AND ALFRED- PATIENT WOULD LIKE DATES ADDED TO HER CHART

## 2022-01-28 NOTE — PROGRESS NOTES
Javier Reno is a 56 y.o.. female.     You have chosen to receive care through a telephone visit. Do you consent to use a telephone visit for your medical care today? yes    Pt's visit today is for c/o cough, fever, congestion, and body aches. Pt stating her symptoms started on 22 and was tested positive for covid on 22. Pt stating she got her 3rd covid vaccine on 22.       The following portions of the patient's history were reviewed and updated as appropriate: allergies, current medications, past family history, past medical history, past social history, past surgical history and problem list.    Past Medical History:   Diagnosis Date   • Abdominal pain 2002    NEGATIVE ABDOMINAL SERIES, SEEN AT [facility] ER   • Adenomyosis    • Adjustment disorder with mixed anxiety and depressed mood    • Allergic rhinitis    • Anxiety    • Breast injury     fell and bruised rt breast mamny years.   • Colon polyps    • Depression    • Dermatitis 12/10/2014   • Disease of thyroid gland     HYPOTHYROIDISM   • Dyspareunia, female    • Erosive esophagitis 2003    DR. LEWIS EGD   • Eustachian tube disorder     BILATERAL   • Gastritis 2003    MILD, DR. LEWIS EGD   • Generalized anxiety disorder    • Genital herpes simplex    • History of menorrhagia    • Irritable bowel syndrome with constipation    • Left leg pain 2007    MRI OF LUMBAR SPINE AT [facility] WAS WNL   • Melanosis coli 2003    DR. CLAUDINE LEWIS CY   • Menopausal state    • Ovarian cyst 2002    CT SCAN AT [facility] FOR 3CM LEFT OVARIAN CYST   • Right sided abdominal pain 2002    CT SCAN SHOWED TRACE AMT OF FREE FLUID IN RIGHT HEMIPELVIS, UTERUS AND ADNEXA APPEAR UNREMARKABLE, TINY FOLLICLES WITHIN THE RIGHT OVARY   • Sliding hiatal hernia 2003     EGD        Past Surgical History:   Procedure Laterality Date   • AUGMENTATION MAMMAPLASTY     •  SECTION N/A 1999   • COLONOSCOPY N/A 2013     HYPERPLASTIC POLYP, RESCOPE 3 YRS. DR.RUSS RECINOS   • COLONOSCOPY N/A 01/16/2003    MELANOSIS COLI, INTERNAL HEMORRHOIDS, DR. CLAUDINE LEWIS AT [facility]   • DIAGNOSTIC LAPAROSCOPY N/A 08/12/2002    NORMAL PELVIS, NORMAL APPENDIX, QUESTIONABLE ADENOMYOSIS, DR. VEENA DURANT AT [facility]   • ENDOMETRIAL ABLATION N/A 06/03/2002    THERMACHOICE, D&C, AND HYSTEROSCOPY, DR. VEENA DURANT AT [facility]   • ENDOSCOPY N/A 01/16/2003    EROSIVE ESOPHAGITIS, SLIDING HIATAL HERNIA, MILD GASTRITIS, DR. CLAUDINE LEWIS AT [facility]   • ENDOSCOPY N/A 7/19/2021    Procedure: ESOPHAGOGASTRODUODENOSCOPY with cold biopsies;  Surgeon: Cinda Reyes MD;  Location: [facility] ENDOSCOPY;  Service: Gastroenterology;  Laterality: N/A;  pre: reflux, upper abdominal pain, nasuea  post:hiatal hernia, gastritis, duodenitis   • OTHER SURGICAL HISTORY N/A 11/2007    SITZ MARKER STUDY- ALL MARKERS PASSED, KUB AT [facility]   • SUBTOTAL HYSTERECTOMY N/A 02/01/2010    [facility] D/T ADENOMYOSIS,MENORRHAGIA, DR. VEENA DURANT AT [facility]       Review of Systems   Constitutional: Positive for fever (highest was 102 on 1/26).   HENT: Positive for congestion and sore throat. Negative for ear pain, postnasal drip and rhinorrhea.    Respiratory: Positive for cough. Negative for shortness of breath.    Gastrointestinal: Positive for nausea. Negative for diarrhea and vomiting.        History of Ibs   Genitourinary: Negative.    Musculoskeletal: Positive for arthralgias.   Neurological: Positive for headaches. Negative for dizziness.       Allergies   Allergen Reactions   • Codeine Nausea Only   • Keflex [Cephalexin] Hives       Objective     There were no vitals filed for this visit.; telephone visit  There is no height or weight on file to calculate BMI.;telephone visit    Physical Exam; telephone visit      Current Outpatient Medications:   •  brompheniramine-pseudoephedrine-DM 30-2-10 MG/5ML syrup, TAKE 10 ML BY MOUTH EVERY 4 TO 6 HOURS AS NEEDED, Disp: , Rfl:   •  citalopram (CeleXA) 20  MG tablet, Take 1 tablet by mouth Daily., Disp: 90 tablet, Rfl: 3  •  eszopiclone (LUNESTA) 3 MG tablet, TAKE 1 TABLET BY MOUTH EVERY NIGHT AT BEDTIME, Disp: 30 tablet, Rfl: 0  •  fluticasone (FLOVENT HFA) 220 MCG/ACT inhaler, 2 sprays into the mouth and swallowed twice a day for 1 wk, then 1 spray into the mouth twice a day for 1 wk, then 1 spray into the mouth once a day for 1 wk, Disp: 2 each, Rfl: 3  •  methylPREDNISolone (MEDROL) 4 MG dose pack, Take as directed on package instructions., Disp: 1 each, Rfl: 0  •  ondansetron (Zofran) 4 MG tablet, Take 1 tablet by mouth Every 8 (Eight) Hours As Needed for Nausea or Vomiting., Disp: 12 tablet, Rfl: 0  •  pantoprazole (PROTONIX) 20 MG EC tablet, Take 1 tablet by mouth Every Morning Before Breakfast., Disp: 90 tablet, Rfl: 1  •  Plecanatide (Trulance) 3 MG tablet, Take 1 tablet by mouth Every Morning., Disp: 30 tablet, Rfl: 3  •  promethazine-dextromethorphan (PROMETHAZINE-DM) 6.25-15 MG/5ML syrup, Take 5 mL by mouth 3 (Three) Times a Day As Needed for Cough., Disp: 150 mL, Rfl: 0  •  rosuvastatin (Crestor) 5 MG tablet, Take 1 tablet by mouth Daily., Disp: 90 tablet, Rfl: 3  •  Synthroid 150 MCG tablet, Take 1 tablet by mouth Daily., Disp: 90 tablet, Rfl: 3  •  valACYclovir (VALTREX) 500 MG tablet, TAKE 1 TABLET BY MOUTH DAILY, Disp: 90 tablet, Rfl: 1    Time: 25 minutes    Assessment/Plan   Diagnoses and all orders for this visit:    1. COVID-19 virus infection (Primary)    2. Cough  -     methylPREDNISolone (MEDROL) 4 MG dose pack; Take as directed on package instructions.  Dispense: 1 each; Refill: 0  -     promethazine-dextromethorphan (PROMETHAZINE-DM) 6.25-15 MG/5ML syrup; Take 5 mL by mouth 3 (Three) Times a Day As Needed for Cough.  Dispense: 150 mL; Refill: 0    3. Nausea  -     ondansetron (Zofran) 4 MG tablet; Take 1 tablet by mouth Every 8 (Eight) Hours As Needed for Nausea or Vomiting.  Dispense: 12 tablet; Refill: 0        Patient Instructions   Drink  plenty of fluids-water preferably, eat a heart healthy diet, get plenty of sleep and do warm salt water gargles twice a day until feeling better; Recommend pt to stay in quarantine until 1/30/22 and if still having symptoms to stay in quarantine for another 5 days. Discussed different treatment options for covid at this time. Pt declining Molnupiravir at this time.       Return if symptoms worsen or fail to improve.

## 2022-02-14 RX ORDER — PANTOPRAZOLE SODIUM 20 MG/1
20 TABLET, DELAYED RELEASE ORAL
Qty: 30 TABLET | Refills: 2 | Status: SHIPPED | OUTPATIENT
Start: 2022-02-14

## 2022-02-18 DIAGNOSIS — F41.9 ANXIETY: ICD-10-CM

## 2022-02-18 NOTE — TELEPHONE ENCOUNTER
Rx Refill Note  Requested Prescriptions     Pending Prescriptions Disp Refills   • eszopiclone (LUNESTA) 3 MG tablet [Pharmacy Med Name: ESZOPICLONE 3MG TABLETS] 30 tablet 0     Sig: TAKE 1 TABLET BY MOUTH EVERY NIGHT AT BEDTIME      Last office visit with prescribing clinician: 6/18/2021      Next office visit with prescribing clinician: Visit date not found            Valery Cuellar MA  02/18/22, 10:11 EST

## 2022-02-23 RX ORDER — ESZOPICLONE 3 MG/1
TABLET, FILM COATED ORAL
Qty: 30 TABLET | Refills: 0 | Status: SHIPPED | OUTPATIENT
Start: 2022-02-23 | End: 2022-04-11

## 2022-02-23 NOTE — TELEPHONE ENCOUNTER
CALLED PT AND [facility] PER PT DUE FOR FASTING LABS AND FOLLOW UP WITH DR. RAMIREZ TO READ:    PT IS DUE FOR FASTING LABS AND FOLLOW UP WITH DR FOR MORE MEDICATION REFILLS

## 2022-02-23 NOTE — TELEPHONE ENCOUNTER
MESSAGE IS UP FOR DR. HUTCHISON TO APPROVE RX. HE IS NOT IN THE OFFICE THIS AM, BUT WILL BE HERE THIS AFTERNOON.

## 2022-02-23 NOTE — TELEPHONE ENCOUNTER
PATIENT CALLED STATING SHE IS OUT OF MEDICATION SHE WOULD LIKE TO GET A REFILL BEFORE SHE LEAVES OUT OF TOWN.    PLEASE ADVISE  [phone]

## 2022-03-23 ENCOUNTER — HOSPITAL ENCOUNTER (OUTPATIENT)
Dept: MAMMOGRAPHY | Facility: HOSPITAL | Age: 57
Discharge: HOME | End: 2022-03-23
Admitting: NURSE PRACTITIONER

## 2022-03-23 ENCOUNTER — OFFICE VISIT (OUTPATIENT)
Dept: OBSTETRICS AND GYNECOLOGY | Age: 57
End: 2022-03-23

## 2022-03-23 VITALS
BODY MASS INDEX: 30.9 KG/M2 | DIASTOLIC BLOOD PRESSURE: 68 MMHG | HEIGHT: 64 IN | WEIGHT: 181 LBS | SYSTOLIC BLOOD PRESSURE: 106 MMHG

## 2022-03-23 DIAGNOSIS — Z12.31 SCREENING MAMMOGRAM FOR BREAST CANCER: ICD-10-CM

## 2022-03-23 DIAGNOSIS — Z01.419 WELL WOMAN EXAM WITH ROUTINE GYNECOLOGICAL EXAM: Primary | ICD-10-CM

## 2022-03-23 PROCEDURE — 77063 BREAST TOMOSYNTHESIS BI: CPT

## 2022-03-23 PROCEDURE — 99396 PREV VISIT EST AGE 40-64: CPT | Performed by: NURSE PRACTITIONER

## 2022-03-23 PROCEDURE — 77067 SCR MAMMO BI INCL CAD: CPT

## 2022-03-23 NOTE — PROGRESS NOTES
[facility], [place], operated by [facility]  Routine Annual Visit    3/23/2022    Patient: Zee Reno          MR#:[id number]      History of Present Illness    56 y.o. female  who presents for annual exam.    Mammogram is scheduled today.     H/O hysterectomy    Denies continues to struggle with her thyroid, weight gain, and insomnia. She is seeing a new provider with full bloodwork next week. She is not currently on HRT but had some questions regarding that. We had a discussion regarding estrogen replacement therapy- indications, risks, benefits, alternatives    No LMP recorded (lmp unknown). Patient has had a hysterectomy.  Obstetric History:  OB History        2    Para   2    Term   2            AB        Living   2       SAB        IAB        Ectopic        Molar        Multiple        Live Births   2               Menstrual History:     No LMP recorded (lmp unknown). Patient has had a hysterectomy.       Sexual History:       ________________________________________  Patient Active Problem List   Diagnosis   • Generalized anxiety disorder   • Hypothyroidism   • Irritable bowel syndrome   • Anxiety   • Hyperlipidemia   • Primary insomnia   • Chest pain   • Gastroesophageal reflux disease   • Esophageal dysphagia   • Bladder irritability   • History of tobacco use   • Abnormal weight gain   • Dyspnea   • Erosive esophagitis   • Hiatal hernia   • Palpitations       Past Medical History:   Diagnosis Date   • Abdominal pain 2002    NEGATIVE ABDOMINAL SERIES, SEEN AT [facility] ER   • Adenomyosis    • Adjustment disorder with mixed anxiety and depressed mood    • Allergic rhinitis    • Anxiety    • Breast injury     fell and bruised rt breast mamny years.   • Colon polyps    • Depression    • Dermatitis 12/10/2014   • Disease of thyroid gland     HYPOTHYROIDISM   • Dyspareunia, female    • Erosive esophagitis 2003    DR. DEBBIE PYLE   • Eustachian tube disorder     BILATERAL   • Gastritis 2003    MILD,  DR. LEWIS EGD   • Generalized anxiety disorder    • Genital herpes simplex    • History of menorrhagia    • Irritable bowel syndrome with constipation    • Left leg pain 2007    MRI OF LUMBAR SPINE AT [facility] WAS WNL   • Melanosis coli 2003    DR. CLAUDINE LEWIS CY   • Menopausal state    • Ovarian cyst 2002    CT SCAN AT [facility] FOR 3CM LEFT OVARIAN CYST   • Right sided abdominal pain 2002    CT SCAN SHOWED TRACE AMT OF FREE FLUID IN RIGHT HEMIPELVIS, UTERUS AND ADNEXA APPEAR UNREMARKABLE, TINY FOLLICLES WITHIN THE RIGHT OVARY   • Sliding hiatal hernia 2003     EGD        Past Surgical History:   Procedure Laterality Date   • AUGMENTATION MAMMAPLASTY     •  SECTION N/A 1999   • COLONOSCOPY N/A 2013    HYPERPLASTIC POLYP, RESCOPE 3 YRS. DR.RUSS RECINOS   • COLONOSCOPY N/A 2003    MELANOSIS COLI, INTERNAL HEMORRHOIDS, DR. CLAUDINE LEWIS AT [facility]   • DIAGNOSTIC LAPAROSCOPY N/A 2002    NORMAL PELVIS, NORMAL APPENDIX, QUESTIONABLE ADENOMYOSIS, DR. VEENA DURANT AT [facility]   • ENDOMETRIAL ABLATION N/A 2002    THERMACHOICE, D&C, AND HYSTEROSCOPY, DR. VEENA DURANT AT [facility]   • ENDOSCOPY N/A 2003    EROSIVE ESOPHAGITIS, SLIDING HIATAL HERNIA, MILD GASTRITIS, DR. CLAUDINE LEWIS AT [facility]   • ENDOSCOPY N/A 2021    Procedure: ESOPHAGOGASTRODUODENOSCOPY with cold biopsies;  Surgeon: Cinda Reyes MD;  Location: [facility] ENDOSCOPY;  Service: Gastroenterology;  Laterality: N/A;  pre: reflux, upper abdominal pain, nasuea  post:hiatal hernia, gastritis, duodenitis   • OTHER SURGICAL HISTORY N/A 2007    SITZ MARKER STUDY- ALL MARKERS PASSED, KUB AT [facility]   • SUBTOTAL HYSTERECTOMY N/A 2010    [facility] D/T ADENOMYOSIS,MENORRHAGIA, DR. VEENA DURANT AT [facility]       Social History     Tobacco Use   Smoking Status Never Smoker   Smokeless Tobacco Never Used       Family History   Problem Relation Age of Onset   • Alzheimer's disease Mother    • Hypertension Mother     • Heart disease Maternal Grandfather    • No Known Problems Son    • Alzheimer's disease Maternal Grandmother    • No Known Problems Son    • Colon polyps Paternal Grandfather        Prior to Admission medications    Medication Sig Start Date End Date Taking? Authorizing Provider   citalopram (CeleXA) 20 MG tablet Take 1 tablet by mouth Daily. 7/29/21  Yes Davidson Buenrostro MD   eszopiclone (LUNESTA) 3 MG tablet TAKE 1 TABLET BY MOUTH EVERY NIGHT AT BEDTIME 2/23/22  Yes Davidson Buenrostro MD   pantoprazole (PROTONIX) 20 MG EC tablet TAKE 1 TABLET BY MOUTH EVERY MORNING BEFORE BREAKFAST 2/14/22  Yes Cinda Reyes MD   Plecanatide (Trulance) 3 MG tablet Take 1 tablet by mouth Every Morning. 3/17/21  Yes Cinda Reyes MD   Synthroid 150 MCG tablet Take 1 tablet by mouth Daily. 10/7/21  Yes Davidson Buenrostro MD   valACYclovir (VALTREX) 500 MG tablet TAKE 1 TABLET BY MOUTH DAILY 6/29/21  Yes Davidson Buenrostro MD   brompheniramine-pseudoephedrine-DM 30-2-10 MG/5ML syrup TAKE 10 ML BY MOUTH EVERY 4 TO 6 HOURS AS NEEDED 12/19/21   Provider, MD Jonn   fluticasone (FLOVENT HFA) 220 MCG/ACT inhaler 2 sprays into the mouth and swallowed twice a day for 1 wk, then 1 spray into the mouth twice a day for 1 wk, then 1 spray into the mouth once a day for 1 wk 7/21/21   Cinda Reyes MD   methylPREDNISolone (MEDROL) 4 MG dose pack Take as directed on package instructions. 1/28/22   Maylin Nelson APRN   ondansetron (Zofran) 4 MG tablet Take 1 tablet by mouth Every 8 (Eight) Hours As Needed for Nausea or Vomiting. 1/28/22   Maylin Nelson APRN   promethazine-dextromethorphan (PROMETHAZINE-DM) 6.25-15 MG/5ML syrup Take 5 mL by mouth 3 (Three) Times a Day As Needed for Cough. 1/28/22   Maylin Nelson APRN   rosuvastatin (Crestor) 5 MG tablet Take 1 tablet by mouth Daily. 6/18/21   Davidson Buenrostro, MD     ________________________________________    The following portions of the patient's history were  "reviewed and updated as appropriate: allergies, current medications, past family history, past medical history, past social history, past surgical history and problem list.    Review of Systems   Constitutional: Negative.    HENT: Negative.    Eyes: Negative for visual disturbance.   Respiratory: Negative for cough, shortness of breath and wheezing.    Cardiovascular: Negative for chest pain, palpitations and leg swelling.   Gastrointestinal: Negative for abdominal distention, abdominal pain, blood in stool, constipation, diarrhea, nausea and vomiting.   Endocrine: Negative for cold intolerance and heat intolerance.   Genitourinary: Negative for difficulty urinating, dyspareunia, dysuria, frequency, genital sores, hematuria, menstrual problem, pelvic pain, urgency, vaginal bleeding, vaginal discharge and vaginal pain.   Musculoskeletal: Negative.    Skin: Negative.    Neurological: Negative for dizziness, weakness, light-headedness, numbness and headaches.   Hematological: Negative.    Psychiatric/Behavioral: Negative.    Breasts: negative for lumps skin changes, dimpling, swelling, nipple changes/discharge bilaterally         Objective   Physical Exam    /68   Ht 162.6 cm (64\")   Wt 82.1 kg (181 lb)   LMP  (LMP Unknown)   Breastfeeding No   BMI 31.07 kg/m²    BP Readings from Last 3 Encounters:   03/23/22 106/68   07/19/21 106/66   06/18/21 120/80      Wt Readings from Last 3 Encounters:   03/23/22 82.1 kg (181 lb)   07/19/21 79.8 kg (176 lb)   06/18/21 80 kg (176 lb 6.4 oz)        BMI: Estimated body mass index is 31.07 kg/m² as calculated from the following:    Height as of this encounter: 162.6 cm (64\").    Weight as of this encounter: 82.1 kg (181 lb).            General:   alert, appears stated age and cooperative   Heart: regular rate and rhythm, S1, S2 normal, no murmur, click, rub or gallop   Lungs: clear to auscultation bilaterally   Abdomen: soft, non-tender, without masses or organomegaly "   Breast: inspection negative, no nipple discharge or bleeding, no masses or nodularity palpable   Vulva: External genitalia including bartholin's glands, Urethra, Bennett's gland and urethra meatus are normal, Perineum, rectum and anus appear normal  and Bladder appears normal without significant prolapse    Vagina: normal mucosa, normal discharge   Cervix: absent   Uterus: absent    Adnexa: no mass, fullness, tenderness     Assessment:    Diagnoses and all orders for this visit:    1. Well woman exam with routine gynecological exam (Primary)  -     IgP, Aptima HPV    Other orders  -     Cancel: Mammo Screening Digital Tomosynthesis Bilateral With CAD; Future        Healthy lifestyle modifications discussed, counseled on self breast exams and bone health    All of the patient's questions were addressed and answered, I have encouraged her to call for today's test results if she has not received them within 10 days.  Patient is advised to call with any change in her condition or with any other questions, otherwise return in 12 months for annual examination.      Elizabeth Cerda, PRUDENCE  3/23/2022 09:36 EDT

## 2022-04-09 DIAGNOSIS — F41.9 ANXIETY: ICD-10-CM

## 2022-04-11 RX ORDER — ESZOPICLONE 3 MG/1
TABLET, FILM COATED ORAL
Qty: 30 TABLET | Refills: 0 | Status: SHIPPED | OUTPATIENT
Start: 2022-04-11 | End: 2022-05-06

## 2022-04-11 NOTE — TELEPHONE ENCOUNTER
Rx Refill Note  Requested Prescriptions     Pending Prescriptions Disp Refills   • eszopiclone (LUNESTA) 3 MG tablet [Pharmacy Med Name: ESZOPICLONE 3MG TABLETS] 30 tablet      Sig: TAKE 1 TABLET BY MOUTH EVERY NIGHT AT BEDTIME      Last office visit with prescribing clinician: 6/18/2021      Next office visit with prescribing clinician: 4/9/2022            Valery Cuellar MA  04/11/22, 09:28 EDT

## 2022-05-04 DIAGNOSIS — F41.9 ANXIETY: ICD-10-CM

## 2022-05-06 RX ORDER — ESZOPICLONE 3 MG/1
TABLET, FILM COATED ORAL
Qty: 30 TABLET | Refills: 1 | Status: SHIPPED | OUTPATIENT
Start: 2022-05-06 | End: 2022-05-19 | Stop reason: SDUPTHER

## 2022-05-06 NOTE — TELEPHONE ENCOUNTER
Rx Refill Note  Requested Prescriptions     Pending Prescriptions Disp Refills   • eszopiclone (LUNESTA) 3 MG tablet [Pharmacy Med Name: ESZOPICLONE 3MG TABLETS] 30 tablet      Sig: TAKE 1 TABLET BY MOUTH EVERY NIGHT AT BEDTIME      Last office visit with prescribing clinician: Visit date not found      Next office visit with prescribing clinician: Visit date not found            Mee Rosario MA  05/06/22, 08:37 EDT

## 2022-05-19 ENCOUNTER — OFFICE VISIT (OUTPATIENT)
Dept: FAMILY MEDICINE CLINIC | Facility: CLINIC | Age: 57
End: 2022-05-19

## 2022-05-19 VITALS
HEART RATE: 85 BPM | WEIGHT: 177.8 LBS | RESPIRATION RATE: 18 BRPM | SYSTOLIC BLOOD PRESSURE: 112 MMHG | OXYGEN SATURATION: 97 % | TEMPERATURE: 97.8 F | BODY MASS INDEX: 30.35 KG/M2 | HEIGHT: 64 IN | DIASTOLIC BLOOD PRESSURE: 80 MMHG

## 2022-05-19 DIAGNOSIS — R63.5 ABNORMAL WEIGHT GAIN: ICD-10-CM

## 2022-05-19 DIAGNOSIS — F41.9 ANXIETY: ICD-10-CM

## 2022-05-19 DIAGNOSIS — E03.9 ACQUIRED HYPOTHYROIDISM: ICD-10-CM

## 2022-05-19 DIAGNOSIS — F51.01 PRIMARY INSOMNIA: ICD-10-CM

## 2022-05-19 DIAGNOSIS — Z00.00 HEALTH MAINTENANCE EXAMINATION: ICD-10-CM

## 2022-05-19 DIAGNOSIS — E78.5 HYPERLIPIDEMIA, UNSPECIFIED HYPERLIPIDEMIA TYPE: Primary | ICD-10-CM

## 2022-05-19 DIAGNOSIS — K21.9 GASTROESOPHAGEAL REFLUX DISEASE, UNSPECIFIED WHETHER ESOPHAGITIS PRESENT: ICD-10-CM

## 2022-05-19 PROCEDURE — 99213 OFFICE O/P EST LOW 20 MIN: CPT | Performed by: INTERNAL MEDICINE

## 2022-05-19 RX ORDER — VALACYCLOVIR HYDROCHLORIDE 500 MG/1
500 TABLET, FILM COATED ORAL DAILY
Qty: 90 TABLET | Refills: 1 | Status: SHIPPED | OUTPATIENT
Start: 2022-05-19 | End: 2023-03-14 | Stop reason: SDUPTHER

## 2022-05-19 RX ORDER — ESZOPICLONE 3 MG/1
3 TABLET, FILM COATED ORAL
Qty: 30 TABLET | Refills: 5 | Status: SHIPPED | OUTPATIENT
Start: 2022-05-19 | End: 2022-07-19 | Stop reason: SDUPTHER

## 2022-05-19 RX ORDER — LEVOTHYROXINE, LIOTHYRONINE 19; 4.5 UG/1; UG/1
TABLET ORAL
COMMUNITY
Start: 2022-05-05 | End: 2023-02-09

## 2022-05-19 RX ORDER — PROGESTERONE 100 MG/1
100 CAPSULE ORAL
COMMUNITY
Start: 2022-05-05

## 2022-05-19 RX ORDER — CITALOPRAM 20 MG/1
20 TABLET ORAL DAILY
Qty: 90 TABLET | Refills: 3 | Status: SHIPPED | OUTPATIENT
Start: 2022-05-19 | End: 2022-10-04

## 2022-05-19 NOTE — PROGRESS NOTES
Javier Reno is a 56 y.o. female. Patient is here today for follow-up and medicine refills.  She has not had any recent labs that I can view.  She has a history of hyperlipidemia, hypothyroidism, GERD and is generally felt pretty well.  She recently had a lot of laboratory studies done at another doctor's office and will send us the results.  She continues to use Lunesta for sleep and is on T[place] Thyroid now.  She continues on pantoprazole for the stomach.  She just recently saw the gynecologist with a normal exam    Chief Complaint   Patient presents with   • Hyperlipidemia     INSOMNIA- PT HERE FOR MED CHECK           Vitals:    05/19/22 1512   BP: 112/80   Pulse: 85   Resp: 18   Temp: 97.8 °F (36.6 °C)   SpO2: 97%     Body mass index is 30.5 kg/m².  The following portions of the patient's history were reviewed and updated as appropriate: allergies, current medications, past family history, past medical history, past social history, past surgical history and problem list.    Past Medical History:   Diagnosis Date   • Abdominal pain 07/08/2002    NEGATIVE ABDOMINAL SERIES, SEEN AT [facility] ER   • Adenomyosis    • Adjustment disorder with mixed anxiety and depressed mood    • Allergic rhinitis    • Anxiety    • Breast injury     fell and bruised rt breast mamny years.   • Colon polyps    • Depression    • Dermatitis 12/10/2014   • Disease of thyroid gland     HYPOTHYROIDISM   • Dyspareunia, female    • Erosive esophagitis 01/16/2003    DR. LEWIS EGD   • Eustachian tube disorder     BILATERAL   • Gastritis 01/16/2003    MILD, DR. LEWIS EGD   • Generalized anxiety disorder    • Genital herpes simplex    • GERD (gastroesophageal reflux disease) 2005   • History of menorrhagia    • Hyperlipidemia 2017   • Irritable bowel syndrome with constipation    • Left leg pain 11/08/2007    MRI OF LUMBAR SPINE AT [facility] WAS WNL   • Melanosis coli 01/16/2003    DR. CLAUDINE LEWIS CY   • Menopausal state    • Ovarian cyst  03/07/2002    CT SCAN AT [facility] FOR 3CM LEFT OVARIAN CYST   • Right sided abdominal pain 04/02/2002    CT SCAN SHOWED TRACE AMT OF FREE FLUID IN RIGHT HEMIPELVIS, UTERUS AND ADNEXA APPEAR UNREMARKABLE, TINY FOLLICLES WITHIN THE RIGHT OVARY   • Sliding hiatal hernia 01/16/2003     EGD       Allergies   Allergen Reactions   • Codeine Nausea Only   • Keflex [Cephalexin] Hives      Social History     Socioeconomic History   • Marital status:    Tobacco Use   • Smoking status: Never Smoker   • Smokeless tobacco: Never Used   Vaping Use   • Vaping Use: Never used   Substance and Sexual Activity   • Alcohol use: Not Currently   • Drug use: No   • Sexual activity: Not Currently     Partners: Male     Birth control/protection: Surgical        Current Outpatient Medications:   •  citalopram (CeleXA) 20 MG tablet, Take 1 tablet by mouth Daily., Disp: 90 tablet, Rfl: 3  •  eszopiclone (LUNESTA) 3 MG tablet, Take 1 tablet by mouth every night at bedtime. Take immediately before bedtime, Disp: 30 tablet, Rfl: 5  •  NALTREXONE HCL PO, 4 mg., Disp: , Rfl:   •  NP Thyroid 30 MG tablet, TAKE 2 TABLETS BY MOUTH EVERY MORNING AND 1 TABLET BY MOUTH EVERY AFTERNOON, Disp: , Rfl:   •  pantoprazole (PROTONIX) 20 MG EC tablet, TAKE 1 TABLET BY MOUTH EVERY MORNING BEFORE BREAKFAST, Disp: 30 tablet, Rfl: 2  •  Plecanatide (Trulance) 3 MG tablet, Take 1 tablet by mouth Every Morning., Disp: 30 tablet, Rfl: 3  •  Progesterone (PROMETRIUM) 100 MG capsule, Take 100 mg by mouth every night at bedtime., Disp: , Rfl:   •  valACYclovir (VALTREX) 500 MG tablet, Take 1 tablet by mouth Daily., Disp: 90 tablet, Rfl: 1     Objective     History of Present Illness     Review of Systems   All other systems reviewed and are negative.      Physical Exam  Vitals and nursing note reviewed.   Constitutional:       General: She is not in acute distress.     Appearance: Normal appearance. She is not ill-appearing.   HENT:      Head: Normocephalic and  atraumatic.   Cardiovascular:      Rate and Rhythm: Normal rate and regular rhythm.      Heart sounds: Normal heart sounds.   Pulmonary:      Effort: Pulmonary effort is normal. No respiratory distress.      Breath sounds: Normal breath sounds. No wheezing or rales.   Skin:     General: Skin is warm and dry.   Neurological:      General: No focal deficit present.      Mental Status: She is alert and oriented to person, place, and time.   Psychiatric:         Mood and Affect: Mood normal.         Behavior: Behavior normal.         ASSESSMENT #1-hyperlipidemia, asymptomatic  #2-hypothyroidism, clinically euthyroid  #3-history of GERD, stable on pantoprazole  #4-insomnia, controlled by Lunesta       Problems Addressed this Visit        Cardiac and Vasculature    Hyperlipidemia - Primary       Endocrine and Metabolic    Hypothyroidism    Relevant Medications    NP Thyroid 30 MG tablet       Gastrointestinal Abdominal     Gastroesophageal reflux disease       Mental Health    Anxiety    Relevant Medications    eszopiclone (LUNESTA) 3 MG tablet       Sleep    Primary insomnia      Diagnoses       Codes Comments    Hyperlipidemia, unspecified hyperlipidemia type    -  Primary ICD-10-CM: E78.5  ICD-9-CM: 272.4     Acquired hypothyroidism     ICD-10-CM: E03.9  ICD-9-CM: 244.9     Gastroesophageal reflux disease, unspecified whether esophagitis present     ICD-10-CM: K21.9  ICD-9-CM: 530.81     Anxiety     ICD-10-CM: F41.9  ICD-9-CM: 300.00     Primary insomnia     ICD-10-CM: F51.01  ICD-9-CM: 307.42           PLAN medications were refilled.  I recommended the patient get a COVID-19 vaccination with either Pfizer or Moderna.  I would like to recheck her in 6 months with laboratory studies    There are no Patient Instructions on file for this visit.  Return in about 6 months (around 11/19/2022) for with labs.

## 2022-06-20 NOTE — RADIOLOGY REPORT (SQ)
EXAM DESCRIPTION:  U/S ABDOMEN LIMITED W/O DOP



COMPLETED DATE/TIME:  1/12/2020 10:45 am



REASON FOR STUDY:  epigastric pain



COMPARISON:  None.



TECHNIQUE:  Dynamic and static grayscale images acquired of the abdomen and recorded on PACS. Additio
nal selected color Doppler and spectral images recorded.



LIMITATIONS:  None.



FINDINGS:  PANCREAS: No masses.  Visualized pancreatic duct normal caliber.

LIVER: No masses. Echotexture normal.

LIVER VASCULATURE: Normal directional flow of the main portal vein and hepatic veins.

GALLBLADDER: No stones. Normal wall thickness. No pericholecystic fluid.

ULTRASOUND-DETECTED FORDE'S SIGN: Negative.

INTRAHEPATIC DUCTS AND COMMON DUCT: CBD and intrahepatic ducts normal caliber. No filling defects.

INFERIOR VENA CAVA: Normal flow.

AORTA: No aneurysm.

RIGHT KIDNEY:  Normal size. Normal echogenicity. No solid or suspicious masses. No hydronephrosis. No
 calcifications.

PERITONEAL AND RIGHT PLEURAL SPACE: No ascites or effusions.

OTHER: No other significant findings.



IMPRESSION:  NORMAL RIGHT UPPER QUADRANT ULTRASOUND.



TECHNICAL DOCUMENTATION:  JOB ID:  [id number]

 2011 [employer]- All Rights Reserved



Reading location - IP/workstation name: [email or web address] Detail Level: Detailed Show Applicator Variable?: Yes Render Note In Bullet Format When Appropriate: No Duration Of Freeze Thaw-Cycle (Seconds): 0 Post-Care Instructions: I reviewed with the patient in detail post-care instructions. Patient is to wear sunprotection, and avoid picking at any of the treated lesions. Pt may apply Vaseline to crusted or scabbing areas. Consent: The patient's consent was obtained including but not limited to risks of crusting, scabbing, blistering, scarring, darker or lighter pigmentary change, recurrence, incomplete removal and infection.

## 2022-07-19 DIAGNOSIS — F41.9 ANXIETY: ICD-10-CM

## 2022-07-19 RX ORDER — ESZOPICLONE 3 MG/1
3 TABLET, FILM COATED ORAL
Qty: 30 TABLET | Refills: 5 | Status: SHIPPED | OUTPATIENT
Start: 2022-07-19 | End: 2023-01-12

## 2022-07-29 ENCOUNTER — TELEPHONE (OUTPATIENT)
Dept: FAMILY MEDICINE CLINIC | Facility: CLINIC | Age: 57
End: 2022-07-29

## 2022-07-29 NOTE — TELEPHONE ENCOUNTER
Caller: Zee Reno    Relationship: Self    Best call back number: [phone]      What medication are you requesting: ZPAK AND COUGH MEDICATION.    What are your current symptoms: BAD COUGH, CHILLS     How long have you been experiencing symptoms: ABOUT TWO DAYS.    Have you had these symptoms before:    [x] Yes  [] No    Have you been treated for these symptoms before:   [x] Yes  [] No    If a prescription is needed, what is your preferred pharmacy and phone number: [facility] [address] - [phone] [facility] [phone] FX     Additional notes:  PATIENT STATES THAT HER SON DID TEST POSITIVE FOR COVID BUT WHEN SHE TESTED HERSELF SHE WAS NEGATIVE AND SHE IS ASKING TO HAVE SOMETHING SENT IN FOR HER COUGH ESPECIALLY SHE CAN NOT SLEEP AT NIGHT.

## 2022-10-04 RX ORDER — CITALOPRAM 20 MG/1
20 TABLET ORAL DAILY
Qty: 90 TABLET | Refills: 3 | Status: SHIPPED | OUTPATIENT
Start: 2022-10-04

## 2022-12-06 ENCOUNTER — TELEPHONE (OUTPATIENT)
Dept: FAMILY MEDICINE CLINIC | Facility: CLINIC | Age: 57
End: 2022-12-06

## 2022-12-06 NOTE — TELEPHONE ENCOUNTER
Provider: RHONDA HUTCHISON    Caller: EZE MONSON    Relationship to Patient: SELF    Phone Number: [phone]    Reason for Call: PATIENT STATES SHE KEEPS GETTING SCHEDULED FOR APPOINTMENTS BUT NO ONE FROM THE OFFICE IS CONTACTING HER. PATIENT IS REQUESTING THAT THE OFFICE DOESN'T SCHEDULE HER FOR AN APPOINTMENT UNTIL TALKING WITH HER BECAUSE SHE HAS TO KEEP CALLING AND CANCELING BECAUSE SHE CAN'T COME IN.     PATIENT WAS SCHEDULED BY SOMEONE FOR A 6 MONTH FU. PATIENT IS REQUESTING THAT APPOINTMENT BE THROUGH A MYCHART VIDEO VISIT.    PLEASE CALL PATIENT TO SCHEDULE

## 2022-12-07 NOTE — TELEPHONE ENCOUNTER
HUB TO READ: CALLED AND LEFT MESSAGE FOR PT THAT DR. HUTCHISON DOES NOT DO [employer] VIDEO VISITS, AND ASKED PT TO CALL TO SET UP AN APPT.

## 2022-12-28 RX ORDER — LEVOTHYROXINE SODIUM 150 MCG
150 TABLET ORAL DAILY
Qty: 90 TABLET | Refills: 3 | Status: CANCELLED | OUTPATIENT
Start: 2022-12-28

## 2022-12-28 NOTE — TELEPHONE ENCOUNTER
Caller: Zee Reno    Relationship: Self    Best call back number: [phone]    Requested Prescriptions:   Requested Prescriptions     Pending Prescriptions Disp Refills   • Synthroid 150 MCG tablet [Pharmacy Med Name: SYNTHROID    TAB 150MCG] 90 tablet      Sig: TAKE 1 TABLET DAILY   • Synthroid 150 MCG tablet 90 tablet 3     Sig: Take 1 tablet by mouth Daily.        Pharmacy where request should be sent: [address] - [phone]  - [phone] FX     Additional details provided by patient: COMPLETELY OUT OF MEDICATION.     Does the patient have less than a 3 day supply:  [x] Yes  [] No    Would you like a call back once the refill request has been completed: [x] Yes [] No    If the office needs to give you a call back, can they leave a voicemail: [x] Yes [] No    Serena Rascon Rep   12/28/22 13:16 EST

## 2022-12-30 NOTE — TELEPHONE ENCOUNTER
PATIENT CALLED TO CHECK ON THIS REQUEST, PATIENT IS COMPLETELY OUT.    PLEASE ADVISE    CALLBACK: [phone]

## 2023-01-03 RX ORDER — LEVOTHYROXINE SODIUM 150 MCG
TABLET ORAL
Qty: 90 TABLET | Refills: 3 | Status: SHIPPED | OUTPATIENT
Start: 2023-01-03 | End: 2023-01-04 | Stop reason: SDUPTHER

## 2023-01-03 NOTE — TELEPHONE ENCOUNTER
Caller: Zee Reno    Relationship: Self    Best call back number: Zee Reno    Who are you requesting to speak with (clinical staff, provider,  specific staff member): DR HUTCHISON OR MA    What was the call regarding: PATIENT STILL HAS NOT RECEIVED HER MEDICATION, AND HAS BEEN OUT FOR OVER A WEEK NOW.  PLEASE CALL AND ADVISE AS SOON AS ABLE    Do you require a callback: YES

## 2023-01-04 RX ORDER — LEVOTHYROXINE SODIUM 150 MCG
150 TABLET ORAL DAILY
Qty: 90 TABLET | Refills: 3 | Status: SHIPPED | OUTPATIENT
Start: 2023-01-04

## 2023-01-04 NOTE — TELEPHONE ENCOUNTER
Rx Refill Note  Requested Prescriptions     Pending Prescriptions Disp Refills   • Synthroid 150 MCG tablet 90 tablet 3     Sig: Take 1 tablet by mouth Daily.      Last office visit with prescribing clinician: 5/19/2022   Last telemedicine visit with prescribing clinician: 1/12/2023   Next office visit with prescribing clinician: 1/12/2023                         Would you like a call back once the refill request has been completed: [] Yes [] No    If the office needs to give you a call back, can they leave a voicemail: [] Yes [] No    Valery Cuellar MA  01/04/23, 06:34 EST

## 2023-01-11 DIAGNOSIS — F41.9 ANXIETY: ICD-10-CM

## 2023-01-12 RX ORDER — ESZOPICLONE 3 MG/1
TABLET, FILM COATED ORAL
Qty: 30 TABLET | Refills: 2 | Status: SHIPPED | OUTPATIENT
Start: 2023-01-12 | End: 2023-02-09 | Stop reason: SDUPTHER

## 2023-01-12 NOTE — TELEPHONE ENCOUNTER
Rx Refill Note  Requested Prescriptions     Pending Prescriptions Disp Refills   • eszopiclone (LUNESTA) 3 MG tablet [Pharmacy Med Name: eszopiclone 3 mg tablet] 30 tablet 2     Sig: GIVE 1 TABLET BY MOUTH AT BEDTIME      Last office visit with prescribing clinician: 5/19/2022   Last telemedicine visit with prescribing clinician: 2/8/2023   Next office visit with prescribing clinician: 2/8/2023                         Would you like a call back once the refill request has been completed: [] Yes [] No    If the office needs to give you a call back, can they leave a voicemail: [] Yes [] No    Valery Cuellar MA  01/12/23, 06:58 EST

## 2023-02-09 ENCOUNTER — OFFICE VISIT (OUTPATIENT)
Dept: FAMILY MEDICINE CLINIC | Facility: CLINIC | Age: 58
End: 2023-02-09
Payer: COMMERCIAL

## 2023-02-09 VITALS
SYSTOLIC BLOOD PRESSURE: 114 MMHG | OXYGEN SATURATION: 100 % | BODY MASS INDEX: 27.49 KG/M2 | HEART RATE: 67 BPM | HEIGHT: 64 IN | TEMPERATURE: 97.3 F | RESPIRATION RATE: 16 BRPM | DIASTOLIC BLOOD PRESSURE: 60 MMHG | WEIGHT: 161 LBS

## 2023-02-09 DIAGNOSIS — F51.01 PRIMARY INSOMNIA: ICD-10-CM

## 2023-02-09 DIAGNOSIS — E78.5 HYPERLIPIDEMIA, UNSPECIFIED HYPERLIPIDEMIA TYPE: Primary | ICD-10-CM

## 2023-02-09 DIAGNOSIS — E03.9 ACQUIRED HYPOTHYROIDISM: ICD-10-CM

## 2023-02-09 DIAGNOSIS — R00.2 PALPITATIONS: ICD-10-CM

## 2023-02-09 DIAGNOSIS — R63.5 ABNORMAL WEIGHT GAIN: ICD-10-CM

## 2023-02-09 DIAGNOSIS — F41.9 ANXIETY: ICD-10-CM

## 2023-02-09 DIAGNOSIS — K21.9 GASTROESOPHAGEAL REFLUX DISEASE, UNSPECIFIED WHETHER ESOPHAGITIS PRESENT: ICD-10-CM

## 2023-02-09 PROCEDURE — 99214 OFFICE O/P EST MOD 30 MIN: CPT | Performed by: INTERNAL MEDICINE

## 2023-02-09 RX ORDER — ESZOPICLONE 3 MG/1
3 TABLET, FILM COATED ORAL NIGHTLY
Qty: 30 TABLET | Refills: 5 | Status: SHIPPED | OUTPATIENT
Start: 2023-02-09

## 2023-02-09 RX ORDER — PEN NEEDLE, DIABETIC 32 GX 1/4"
NEEDLE, DISPOSABLE MISCELLANEOUS
COMMUNITY
Start: 2023-01-10

## 2023-02-09 RX ORDER — PHENYLPROPANOLAMINE/CLEMASTINE 75-1.34MG
TABLET, EXTENDED RELEASE ORAL
COMMUNITY
Start: 2022-12-12

## 2023-02-09 RX ORDER — ROSUVASTATIN CALCIUM 5 MG/1
5 TABLET, COATED ORAL DAILY
Qty: 90 TABLET | Refills: 3 | OUTPATIENT
Start: 2023-02-09

## 2023-02-09 NOTE — PROGRESS NOTES
Javier Reno is a 57 y.o. female. Patient is here today for follow-up on her insomnia, hyperlipidemia, history of palpitations, weight gain, hypothyroidism and GERD.  She is lost 20 pounds over the past year by watching her diet more closely.  She generally feels well but has had no recent laboratory studies.  She does sleep well on the Lunesta and needs refills.    Chief Complaint   Patient presents with   • Med Refill     Synthroid, Lunesta, citalopram          Vitals:    02/09/23 1532   BP: 114/60   Pulse: 67   Resp: 16   Temp: 97.3 °F (36.3 °C)   SpO2: 100%     Body mass index is 27.62 kg/m².  The following portions of the patient's history were reviewed and updated as appropriate: allergies, current medications, past family history, past medical history, past social history, past surgical history and problem list.    Past Medical History:   Diagnosis Date   • Abdominal pain 07/08/2002    NEGATIVE ABDOMINAL SERIES, SEEN AT [facility] ER   • Adenomyosis    • Adjustment disorder with mixed anxiety and depressed mood    • Allergic rhinitis    • Anxiety    • Breast injury     fell and bruised rt breast mamny years.   • Colon polyps    • Depression    • Dermatitis 12/10/2014   • Disease of thyroid gland     HYPOTHYROIDISM   • Dyspareunia, female    • Erosive esophagitis 01/16/2003    DR. LEWIS EGD   • Eustachian tube disorder     BILATERAL   • Gastritis 01/16/2003    MILD, DR. LEWIS EGD   • Generalized anxiety disorder    • Genital herpes simplex    • GERD (gastroesophageal reflux disease) 2005   • History of menorrhagia    • Hyperlipidemia 2017   • Irritable bowel syndrome with constipation    • Left leg pain 11/08/2007    MRI OF LUMBAR SPINE AT [facility] WAS WNL   • Melanosis coli 01/16/2003    DR. CLAUDINE LEWIS CY   • Menopausal state    • Ovarian cyst 03/07/2002    CT SCAN AT [facility] FOR 3CM LEFT OVARIAN CYST   • Right sided abdominal pain 04/02/2002    CT SCAN SHOWED TRACE AMT OF FREE FLUID IN RIGHT HEMIPELVIS, UTERUS  AND ADNEXA APPEAR UNREMARKABLE, TINY FOLLICLES WITHIN THE RIGHT OVARY   • Sliding hiatal hernia 01/16/2003     EGD       Allergies   Allergen Reactions   • Codeine Nausea Only   • Keflex [Cephalexin] Hives      Social History     Socioeconomic History   • Marital status:    Tobacco Use   • Smoking status: Never   • Smokeless tobacco: Never   Vaping Use   • Vaping Use: Never used   Substance and Sexual Activity   • Alcohol use: Not Currently   • Drug use: No   • Sexual activity: Not Currently     Partners: Male     Birth control/protection: Surgical        Current Outpatient Medications:   •  citalopram (CeleXA) 20 MG tablet, TAKE 1 TABLET BY MOUTH DAILY, Disp: 90 tablet, Rfl: 3  •  eszopiclone (LUNESTA) 3 MG tablet, Take 1 tablet by mouth Every Night. Take immediately before bedtime, Disp: 30 tablet, Rfl: 5  •  Novofine Pen Needle 32G X 6 MM misc, , Disp: , Rfl:   •  Progesterone (PROMETRIUM) 100 MG capsule, Take 100 mg by mouth every night at bedtime., Disp: , Rfl:   •  Synthroid 150 MCG tablet, Take 1 tablet by mouth Daily., Disp: 90 tablet, Rfl: 3  •  valACYclovir (VALTREX) 500 MG tablet, Take 1 tablet by mouth Daily., Disp: 90 tablet, Rfl: 1  •  Depo-Estradiol 5 MG/ML injection, , Disp: , Rfl:   •  NALTREXONE HCL PO, 4 mg., Disp: , Rfl:   •  pantoprazole (PROTONIX) 20 MG EC tablet, TAKE 1 TABLET BY MOUTH EVERY MORNING BEFORE BREAKFAST, Disp: 30 tablet, Rfl: 2  •  Plecanatide (Trulance) 3 MG tablet, Take 1 tablet by mouth Every Morning., Disp: 30 tablet, Rfl: 3     Objective     History of Present Illness     Review of Systems    Physical Exam  Vitals and nursing note reviewed.   Constitutional:       General: She is not in acute distress.     Appearance: Normal appearance. She is not ill-appearing.   HENT:      Head: Normocephalic and atraumatic.      Right Ear: Tympanic membrane, ear canal and external ear normal. There is no impacted cerumen.      Left Ear: Tympanic membrane, ear canal and  external ear normal. There is no impacted cerumen.   Cardiovascular:      Rate and Rhythm: Normal rate and regular rhythm.      Heart sounds: Normal heart sounds.   Pulmonary:      Effort: Pulmonary effort is normal.      Breath sounds: Normal breath sounds.   Skin:     General: Skin is warm and dry.   Neurological:      General: No focal deficit present.      Mental Status: She is alert and oriented to person, place, and time.   Psychiatric:         Mood and Affect: Mood normal.         Behavior: Behavior normal.         ASSESSMENT laboratory studies have been drawn and a urine drug screen is pending  #1-insomnia, stable on Lunesta  #2-hyperlipidemia, asymptomatic with lipid panel pending  #3-history of palpitations, no recent occurrences  #4-history of weight gain but having 20 pound weight loss by dietary measures  #5-hypothyroidism, clinically euthyroid on replacement  #6-GERD, stable on medication and much improved with weight loss     Problems Addressed this Visit        Cardiac and Vasculature    Hyperlipidemia - Primary    Palpitations       Endocrine and Metabolic    Hypothyroidism       Gastrointestinal Abdominal     Gastroesophageal reflux disease       Mental Health    Anxiety    Relevant Medications    eszopiclone (LUNESTA) 3 MG tablet    Other Relevant Orders    Urine Drug Screen - Urine, Clean Catch       Sleep    Primary insomnia    Relevant Orders    Urine Drug Screen - Urine, Clean Catch   Diagnoses       Codes Comments    Hyperlipidemia, unspecified hyperlipidemia type    -  Primary ICD-10-CM: E78.5  ICD-9-CM: 272.4     Palpitations     ICD-10-CM: R00.2  ICD-9-CM: 785.1     Acquired hypothyroidism     ICD-10-CM: E03.9  ICD-9-CM: 244.9     Gastroesophageal reflux disease, unspecified whether esophagitis present     ICD-10-CM: K21.9  ICD-9-CM: 530.81     Primary insomnia     ICD-10-CM: F51.01  ICD-9-CM: 307.42     Anxiety     ICD-10-CM: F41.9  ICD-9-CM: 300.00           PLAN patient will have blood  work drawn and a urine drug screen obtained.  I refilled her Lunesta and she will continue current medicines as now.  I would like to recheck her in 6 months with a CBC, CMP, lipid panel, TSH and free T4    There are no Patient Instructions on file for this visit.  Return in about 6 months (around 8/9/2023) for with labs.

## 2023-02-10 LAB
AMPHETAMINES UR QL SCN: NEGATIVE NG/ML
BARBITURATES UR QL SCN: NEGATIVE NG/ML
BENZODIAZ UR QL SCN: NEGATIVE NG/ML
BZE UR QL SCN: NEGATIVE NG/ML
CANNABINOIDS UR QL SCN: NEGATIVE NG/ML
CREAT UR-MCNC: 46.1 MG/DL (ref 20–300)
LABORATORY COMMENT REPORT: NORMAL
METHADONE UR QL SCN: NEGATIVE NG/ML
OPIATES UR QL SCN: NEGATIVE NG/ML
OXYCODONE+OXYMORPHONE UR QL SCN: NEGATIVE NG/ML
PCP UR QL: NEGATIVE NG/ML
PH UR: 6 [PH] (ref 4.5–8.9)
PROPOXYPH UR QL SCN: NEGATIVE NG/ML

## 2023-03-14 RX ORDER — VALACYCLOVIR HYDROCHLORIDE 500 MG/1
500 TABLET, FILM COATED ORAL DAILY
Qty: 90 TABLET | Refills: 1 | Status: SHIPPED | OUTPATIENT
Start: 2023-03-14

## 2023-04-10 DIAGNOSIS — F41.9 ANXIETY: ICD-10-CM

## 2023-04-11 RX ORDER — ESZOPICLONE 3 MG/1
TABLET, FILM COATED ORAL
Qty: 30 TABLET | Refills: 4 | Status: SHIPPED | OUTPATIENT
Start: 2023-04-11

## 2023-04-11 NOTE — TELEPHONE ENCOUNTER
Rx Refill Note  Requested Prescriptions     Pending Prescriptions Disp Refills   • eszopiclone (LUNESTA) 3 MG tablet [Pharmacy Med Name: eszopiclone 3 mg tablet] 30 tablet 2     Sig: GIVE 1 TABLET BY MOUTH AT BEDTIME      Last office visit with prescribing clinician: 2/9/2023   Last telemedicine visit with prescribing clinician: Visit date not found   Next office visit with prescribing clinician: Visit date not found                         Would you like a call back once the refill request has been completed: [] Yes [] No    If the office needs to give you a call back, can they leave a voicemail: [] Yes [] No    Valery Cuellar MA  04/11/23, 07:46 EDT

## 2023-06-15 ENCOUNTER — TRANSCRIBE ORDERS (OUTPATIENT)
Dept: ADMINISTRATIVE | Facility: HOSPITAL | Age: 58
End: 2023-06-15
Payer: COMMERCIAL

## 2023-06-15 ENCOUNTER — TELEPHONE (OUTPATIENT)
Dept: OBSTETRICS AND GYNECOLOGY | Age: 58
End: 2023-06-15
Payer: COMMERCIAL

## 2023-06-15 DIAGNOSIS — Z12.31 VISIT FOR SCREENING MAMMOGRAM: Primary | ICD-10-CM

## 2023-06-15 NOTE — TELEPHONE ENCOUNTER
Caller: Zee Reno    Relationship: Self    Best call back number: [phone]    What orders are you requesting (i.e. lab or imaging): MAMMOGRAM     In what timeframe would the patient need to come in: 7/21/23    Where will you receive your lab/imaging services: [facility]     Additional notes: PT WOULD LIKE MAMMOGRAM ORDER PLACED SO SHE CAN SCHEDULE MAMMOGRAM

## 2023-08-27 DIAGNOSIS — F41.9 ANXIETY: ICD-10-CM

## 2023-08-28 RX ORDER — VALACYCLOVIR HYDROCHLORIDE 500 MG/1
500 TABLET, FILM COATED ORAL DAILY
Qty: 90 TABLET | Refills: 1 | Status: SHIPPED | OUTPATIENT
Start: 2023-08-28

## 2023-08-29 RX ORDER — ESZOPICLONE 3 MG/1
TABLET, FILM COATED ORAL
Qty: 30 TABLET | Refills: 0 | Status: SHIPPED | OUTPATIENT
Start: 2023-08-29

## 2023-09-28 DIAGNOSIS — F41.9 ANXIETY: ICD-10-CM

## 2023-09-28 RX ORDER — ESZOPICLONE 3 MG/1
TABLET, FILM COATED ORAL
Qty: 30 TABLET | Refills: 0 | Status: SHIPPED | OUTPATIENT
Start: 2023-09-28

## 2023-11-02 DIAGNOSIS — F41.9 ANXIETY: ICD-10-CM

## 2023-11-02 RX ORDER — ESZOPICLONE 3 MG/1
3 TABLET, FILM COATED ORAL
Qty: 30 TABLET | Refills: 0 | Status: SHIPPED | OUTPATIENT
Start: 2023-11-02

## 2023-11-02 RX ORDER — VALACYCLOVIR HYDROCHLORIDE 500 MG/1
500 TABLET, FILM COATED ORAL DAILY
Qty: 90 TABLET | Refills: 1 | OUTPATIENT
Start: 2023-11-02

## 2023-11-02 RX ORDER — LEVOTHYROXINE SODIUM 150 MCG
150 TABLET ORAL DAILY
Qty: 7 TABLET | Refills: 0 | Status: SHIPPED | OUTPATIENT
Start: 2023-11-02

## 2023-11-02 RX ORDER — LEVOTHYROXINE SODIUM 150 MCG
150 TABLET ORAL DAILY
Qty: 90 TABLET | Refills: 3 | OUTPATIENT
Start: 2023-11-02

## 2023-11-02 RX ORDER — VALACYCLOVIR HYDROCHLORIDE 500 MG/1
500 TABLET, FILM COATED ORAL DAILY
Qty: 7 TABLET | Refills: 0 | Status: SHIPPED | OUTPATIENT
Start: 2023-11-02

## 2023-11-02 RX ORDER — ESZOPICLONE 3 MG/1
3 TABLET, FILM COATED ORAL
Qty: 30 TABLET | Refills: 0 | OUTPATIENT
Start: 2023-11-02

## 2023-11-02 NOTE — TELEPHONE ENCOUNTER
Caller: Zee Reno    Relationship: Self    Best call back number: [phone]     Requested Prescriptions:   Requested Prescriptions     Pending Prescriptions Disp Refills    eszopiclone (LUNESTA) 3 MG tablet 30 tablet 0     Sig: Take 1 tablet by mouth every night at bedtime. Take immediately before bedtime    valACYclovir (VALTREX) 500 MG tablet 90 tablet 1     Sig: Take 1 tablet by mouth Daily.    Synthroid 150 MCG tablet 90 tablet 3     Sig: Take 1 tablet by mouth Daily.        [id number] [address] - [phone]  - [phone] FX     Last office visit with prescribing clinician: 2/9/2023   Last telemedicine visit with prescribing clinician: Visit date not found   Next office visit with prescribing clinician: 11/9/2023     Additional details provided by patient: PATIENT NEEDS ENOUGH MEDICATION TO LAST UNTIL UPCOMING APPOINTMENT    Does the patient have less than a 3 day supply:  [x] Yes  [] No    Would you like a call back once the refill request has been completed: [x] Yes [] No    If the office needs to give you a call back, can they leave a voicemail: [x] Yes [] No    Serena Freedman Rep   11/02/23 11:56 EDT

## 2023-11-09 ENCOUNTER — OFFICE VISIT (OUTPATIENT)
Dept: FAMILY MEDICINE CLINIC | Facility: CLINIC | Age: 58
End: 2023-11-09
Payer: COMMERCIAL

## 2023-11-09 VITALS
SYSTOLIC BLOOD PRESSURE: 132 MMHG | TEMPERATURE: 97.8 F | WEIGHT: 135.8 LBS | BODY MASS INDEX: 23.18 KG/M2 | DIASTOLIC BLOOD PRESSURE: 80 MMHG | OXYGEN SATURATION: 98 % | RESPIRATION RATE: 15 BRPM | HEIGHT: 64 IN | HEART RATE: 60 BPM

## 2023-11-09 DIAGNOSIS — R63.5 ABNORMAL WEIGHT GAIN: ICD-10-CM

## 2023-11-09 DIAGNOSIS — E78.5 HYPERLIPIDEMIA, UNSPECIFIED HYPERLIPIDEMIA TYPE: ICD-10-CM

## 2023-11-09 DIAGNOSIS — R00.2 PALPITATIONS: Primary | ICD-10-CM

## 2023-11-09 DIAGNOSIS — E03.9 ACQUIRED HYPOTHYROIDISM: ICD-10-CM

## 2023-11-09 DIAGNOSIS — F41.1 GENERALIZED ANXIETY DISORDER: ICD-10-CM

## 2023-11-09 DIAGNOSIS — F41.9 ANXIETY: ICD-10-CM

## 2023-11-09 DIAGNOSIS — K21.9 GASTROESOPHAGEAL REFLUX DISEASE, UNSPECIFIED WHETHER ESOPHAGITIS PRESENT: ICD-10-CM

## 2023-11-09 DIAGNOSIS — F51.01 PRIMARY INSOMNIA: ICD-10-CM

## 2023-11-09 PROCEDURE — 99214 OFFICE O/P EST MOD 30 MIN: CPT | Performed by: INTERNAL MEDICINE

## 2023-11-09 RX ORDER — VALACYCLOVIR HYDROCHLORIDE 500 MG/1
500 TABLET, FILM COATED ORAL 3 TIMES DAILY PRN
Qty: 90 TABLET | Refills: 1 | Status: SHIPPED | OUTPATIENT
Start: 2023-11-09

## 2023-11-09 RX ORDER — LEVOTHYROXINE SODIUM 150 MCG
150 TABLET ORAL DAILY
Qty: 90 TABLET | Refills: 3 | Status: SHIPPED | OUTPATIENT
Start: 2023-11-09

## 2023-11-09 NOTE — PROGRESS NOTES
Subjective   Zee Reno is a 58 y.o. female. Patient is here today for follow-up on her hyperlipidemia, hypothyroidism, history of weight gain, GERD and anxiety and depression.  Since I saw the patient last she has lost 26 pounds by dietary changes.  She is feeling great and has stopped a lot of her medications including her medicine for GERD and her antidepressant is doing well.  She continues on thyroid medication.  She says she has recent labs which indicated everything in good shape but they are not available.  She has no complaints today.    Chief Complaint   Patient presents with    Med Refill     All meds          Vitals:    11/09/23 1306   BP: 132/80   Pulse: 60   Resp: 15   Temp: 97.8 °F (36.6 °C)   SpO2: 98%     Body mass index is 23.3 kg/m².  The following portions of the patient's history were reviewed and updated as appropriate: allergies, current medications, past family history, past medical history, past social history, past surgical history and problem list.    Past Medical History:   Diagnosis Date    Abdominal pain 07/08/2002    NEGATIVE ABDOMINAL SERIES, SEEN AT [facility] ER    Adenomyosis     Adjustment disorder with mixed anxiety and depressed mood     Allergic rhinitis     Anxiety     Breast injury     fell and bruised rt breast mamny years.    Colon polyps     Depression     Dermatitis 12/10/2014    Disease of thyroid gland     HYPOTHYROIDISM    Dyspareunia, female     Erosive esophagitis 01/16/2003    DR. LEWIS EGD    Eustachian tube disorder     BILATERAL    Gastritis 01/16/2003    MILD, DR. LEWIS EGD    Generalized anxiety disorder     Genital herpes simplex     GERD (gastroesophageal reflux disease) 2005    History of menorrhagia     Hyperlipidemia 2017    Irritable bowel syndrome with constipation     Left leg pain 11/08/2007    MRI OF LUMBAR SPINE AT [facility] WAS WNL    Melanosis coli 01/16/2003    DR. CLAUDINE LEWIS CY    Menopausal state     Ovarian cyst 03/07/2002    CT SCAN AT [facility] FOR 3CM LEFT  OVARIAN CYST    Right sided abdominal pain 04/02/2002    CT SCAN SHOWED TRACE AMT OF FREE FLUID IN RIGHT HEMIPELVIS, UTERUS AND ADNEXA APPEAR UNREMARKABLE, TINY FOLLICLES WITHIN THE RIGHT OVARY    Sliding hiatal hernia 01/16/2003     EGD       Allergies   Allergen Reactions    Codeine Nausea Only    Keflex [Cephalexin] Hives      Social History     Socioeconomic History    Marital status:    Tobacco Use    Smoking status: Never    Smokeless tobacco: Never   Vaping Use    Vaping Use: Never used   Substance and Sexual Activity    Alcohol use: Not Currently    Drug use: No    Sexual activity: Not Currently     Partners: Male     Birth control/protection: Surgical        Current Outpatient Medications:     Synthroid 150 MCG tablet, Take 1 tablet by mouth Daily., Disp: 90 tablet, Rfl: 3    valACYclovir (VALTREX) 500 MG tablet, Take 1 tablet by mouth 3 (Three) Times a Day As Needed (cold sore)., Disp: 90 tablet, Rfl: 1    eszopiclone (LUNESTA) 3 MG tablet, Take 1 tablet by mouth every night at bedtime. Take immediately before bedtime, Disp: 30 tablet, Rfl: 0    Progesterone (PROMETRIUM) 100 MG capsule, Take 100 mg by mouth every night at bedtime., Disp: , Rfl:      Objective     History of Present Illness     Review of Systems    Physical Exam  Vitals and nursing note reviewed.   Constitutional:       General: She is not in acute distress.     Appearance: Normal appearance. She is normal weight. She is not ill-appearing.   HENT:      Head: Normocephalic and atraumatic.   Cardiovascular:      Rate and Rhythm: Normal rate and regular rhythm.      Heart sounds: Normal heart sounds.   Pulmonary:      Effort: Pulmonary effort is normal. No respiratory distress.      Breath sounds: Normal breath sounds. No wheezing or rales.   Skin:     General: Skin is warm and dry.   Neurological:      General: No focal deficit present.      Mental Status: She is alert and oriented to person, place, and time.   Psychiatric:          Mood and Affect: Mood normal.         Behavior: Behavior normal.         Assessment    ASSESSMENT  #1-hypothyroidism, clinically euthyroid  #2-anxiety and depression, stable off medication  #3-history of GERD, asymptomatic with weight loss  #4-hyperlipidemia, asymptomatic  #5-insomnia, stable on medication    Problems Addressed this Visit          Cardiac and Vasculature    Hyperlipidemia    Palpitations - Primary       Endocrine and Metabolic    Hypothyroidism    Relevant Medications    Synthroid 150 MCG tablet    RESOLVED: Abnormal weight gain       Mental Health    Generalized anxiety disorder    Anxiety       Sleep    Primary insomnia     Diagnoses         Codes Comments    Palpitations    -  Primary ICD-10-CM: R00.2  ICD-9-CM: 785.1     Hyperlipidemia, unspecified hyperlipidemia type     ICD-10-CM: E78.5  ICD-9-CM: 272.4     Acquired hypothyroidism     ICD-10-CM: E03.9  ICD-9-CM: 244.9     Abnormal weight gain     ICD-10-CM: R63.5  ICD-9-CM: 783.1     Generalized anxiety disorder     ICD-10-CM: F41.1  ICD-9-CM: 300.02     Anxiety     ICD-10-CM: F41.9  ICD-9-CM: 300.00     Primary insomnia     ICD-10-CM: F51.01  ICD-9-CM: 307.42             PLAN I think the patient looks great and is doing well.  I continued her thyroid medication and valacyclovir and as needed for cold sores.  She can be rechecked in 6 months with a CBC, CMP, lipid panel, TSH and free T4 and vitamin D level    There are no Patient Instructions on file for this visit.  Return in about 6 months (around 5/9/2024) for with labs.

## 2023-11-30 ENCOUNTER — TELEPHONE (OUTPATIENT)
Dept: FAMILY MEDICINE CLINIC | Facility: CLINIC | Age: 58
End: 2023-11-30

## 2023-11-30 DIAGNOSIS — F41.9 ANXIETY: ICD-10-CM

## 2023-11-30 RX ORDER — ESZOPICLONE 3 MG/1
3 TABLET, FILM COATED ORAL
Qty: 30 TABLET | Refills: 5 | Status: SHIPPED | OUTPATIENT
Start: 2023-11-30

## 2023-11-30 RX ORDER — ESZOPICLONE 3 MG/1
3 TABLET, FILM COATED ORAL
Qty: 30 TABLET | Refills: 0 | Status: CANCELLED | OUTPATIENT
Start: 2023-11-30

## 2023-11-30 RX ORDER — ESZOPICLONE 3 MG/1
TABLET, FILM COATED ORAL
Qty: 30 TABLET | Refills: 0 | OUTPATIENT
Start: 2023-11-30

## 2023-11-30 NOTE — TELEPHONE ENCOUNTER
Caller: Zee Reno    Relationship to patient: Self    Best call back number: [phone]     Patient is needing: PATIENT STATES THAT THIS PRESCRIPTION WAS SUPPOSED TO HAVE BEEN SENT TO [facility].  SHE REQUESTS THAT THE PRESCRIPTION BE SENT TO [facility].    [facility] [place], [place] - [address] [place] [address] - [phone]  - [phone] FX        PLEASE ADVISE.

## 2023-11-30 NOTE — TELEPHONE ENCOUNTER
Caller: Zee Reno    Relationship: Self    Best call back number: [phone]     Which medication are you concerned about: eszopiclone (LUNESTA) 3 MG tablet     Who prescribed you this medication: FOSTER    What are your concerns: PATIENT IS REQUESTING REFILLS BE ADDED TO THIS PRESCRIPTION.    PATIENT WAS SEEN ON 11-.

## 2023-12-02 DIAGNOSIS — F41.9 ANXIETY: ICD-10-CM

## 2023-12-04 DIAGNOSIS — F41.9 ANXIETY: ICD-10-CM

## 2023-12-04 RX ORDER — ESZOPICLONE 3 MG/1
TABLET, FILM COATED ORAL
Qty: 30 TABLET | Refills: 0 | OUTPATIENT
Start: 2023-12-04

## 2023-12-05 RX ORDER — ESZOPICLONE 3 MG/1
3 TABLET, FILM COATED ORAL
Qty: 30 TABLET | Refills: 5 | Status: SHIPPED | OUTPATIENT
Start: 2023-12-05

## 2023-12-15 ENCOUNTER — HOSPITAL ENCOUNTER (OUTPATIENT)
Dept: MAMMOGRAPHY | Facility: HOSPITAL | Age: 58
Discharge: HOME | End: 2023-12-15
Admitting: STUDENT IN AN ORGANIZED HEALTH CARE EDUCATION/TRAINING PROGRAM
Payer: COMMERCIAL

## 2023-12-15 DIAGNOSIS — Z12.31 VISIT FOR SCREENING MAMMOGRAM: ICD-10-CM

## 2023-12-15 PROCEDURE — 77063 BREAST TOMOSYNTHESIS BI: CPT

## 2023-12-15 PROCEDURE — 77067 SCR MAMMO BI INCL CAD: CPT

## 2023-12-15 NOTE — PROGRESS NOTES
Please call patient to let her know her mammogram is normal and should be repeated in 1 year.  Thank you,  Rimma Talbot MD  12/15/23 16:27 EST

## 2023-12-26 NOTE — TELEPHONE ENCOUNTER
Rx Refill Note  Requested Prescriptions     Pending Prescriptions Disp Refills    Synthroid 150 MCG tablet [Pharmacy Med Name: SYNTHROID 0.15MG (150MCG)TABLETS] 90 tablet 3     Sig: TAKE 1 TABLET BY MOUTH DAILY      Last office visit with prescribing clinician: 11/9/2023   Last telemedicine visit with prescribing clinician: Visit date not found   Next office visit with prescribing clinician: Visit date not found                         Would you like a call back once the refill request has been completed: [] Yes [] No    If the office needs to give you a call back, can they leave a voicemail: [] Yes [] No    Lindsay Massey MA  12/26/23, 14:19 EST

## 2023-12-28 DIAGNOSIS — E03.9 ACQUIRED HYPOTHYROIDISM: Primary | ICD-10-CM

## 2023-12-28 RX ORDER — LEVOTHYROXINE SODIUM 150 MCG
150 TABLET ORAL DAILY
Qty: 14 TABLET | Refills: 0 | Status: SHIPPED | OUTPATIENT
Start: 2023-12-28

## 2024-03-06 ENCOUNTER — TELEPHONE (OUTPATIENT)
Dept: FAMILY MEDICINE CLINIC | Facility: CLINIC | Age: 59
End: 2024-03-06
Payer: COMMERCIAL

## 2024-03-19 ENCOUNTER — OFFICE VISIT (OUTPATIENT)
Dept: FAMILY MEDICINE CLINIC | Facility: CLINIC | Age: 59
End: 2024-03-19
Payer: COMMERCIAL

## 2024-03-19 VITALS
RESPIRATION RATE: 16 BRPM | OXYGEN SATURATION: 99 % | SYSTOLIC BLOOD PRESSURE: 110 MMHG | HEART RATE: 70 BPM | WEIGHT: 136 LBS | DIASTOLIC BLOOD PRESSURE: 64 MMHG | HEIGHT: 64 IN | BODY MASS INDEX: 23.22 KG/M2 | TEMPERATURE: 98.1 F

## 2024-03-19 DIAGNOSIS — F51.01 PRIMARY INSOMNIA: ICD-10-CM

## 2024-03-19 DIAGNOSIS — E03.9 ACQUIRED HYPOTHYROIDISM: Primary | ICD-10-CM

## 2024-03-19 DIAGNOSIS — Z78.0 POST-MENOPAUSAL: ICD-10-CM

## 2024-03-19 DIAGNOSIS — E78.5 HYPERLIPIDEMIA, UNSPECIFIED HYPERLIPIDEMIA TYPE: ICD-10-CM

## 2024-03-19 PROCEDURE — 99214 OFFICE O/P EST MOD 30 MIN: CPT | Performed by: NURSE PRACTITIONER

## 2024-03-19 NOTE — PROGRESS NOTES
Subjective     Zee Reno is a 58 y.o.. female.     Patient here to become established.     Patient here today for follow up on insomnia. Patient taking lunesta 3 mg nightly for past 3 years. Patient stating she was previously in the  and has sleep issues do to PTSD.      Patient here today for follow up on hypothyroidism. Patient stating she has had hypothyroidism for over 24 yrs.     Patient stating she has a son that is 24 yrs old. Patient stating she lost 40 lbs last year.         The following portions of the patient's history were reviewed and updated as appropriate: allergies, current medications, past family history, past medical history, past social history, past surgical history and problem list.    Past Medical History:   Diagnosis Date    Abdominal pain 07/08/2002    NEGATIVE ABDOMINAL SERIES, SEEN AT [facility] ER    Adenomyosis     Adjustment disorder with mixed anxiety and depressed mood     Allergic rhinitis     Anxiety     Breast injury     fell and bruised rt breast mamny years.    Colon polyps     Depression     Dermatitis 12/10/2014    Disease of thyroid gland     HYPOTHYROIDISM    Dyspareunia, female     Erosive esophagitis 01/16/2003    DR. LEWIS EGD    Eustachian tube disorder     BILATERAL    Gastritis 01/16/2003    MILD, DR. LEWIS EGD    Generalized anxiety disorder     Genital herpes simplex     GERD (gastroesophageal reflux disease) 2005    History of menorrhagia     Hyperlipidemia 2017    Irritable bowel syndrome with constipation     Left leg pain 11/08/2007    MRI OF LUMBAR SPINE AT [facility] WAS WNL    Melanosis coli 01/16/2003    DR. CLAUDINE LEWIS CY    Menopausal state     Ovarian cyst 03/07/2002    CT SCAN AT [facility] FOR 3CM LEFT OVARIAN CYST    Right sided abdominal pain 04/02/2002    CT SCAN SHOWED TRACE AMT OF FREE FLUID IN RIGHT HEMIPELVIS, UTERUS AND ADNEXA APPEAR UNREMARKABLE, TINY FOLLICLES WITHIN THE RIGHT OVARY    Sliding hiatal hernia 01/16/2003     EGD        Past Surgical  "History:   Procedure Laterality Date    AUGMENTATION MAMMAPLASTY      over 20 years ago     SECTION N/A 1999    COLONOSCOPY N/A 2013    HYPERPLASTIC POLYP, RESCOPE 3 YRS. DR.RUSS RECINOS    COLONOSCOPY N/A 2003    MELANOSIS COLI, INTERNAL HEMORRHOIDS, DR. CLAUDINE LEWIS AT [facility]    DIAGNOSTIC LAPAROSCOPY N/A 2002    NORMAL PELVIS, NORMAL APPENDIX, QUESTIONABLE ADENOMYOSIS, DR. VEENA DURANT AT [facility]    ENDOMETRIAL ABLATION N/A 2002    THERMACHOICE, D&C, AND HYSTEROSCOPY, DR. VEENA DURANT AT [facility]    ENDOSCOPY N/A 2003    EROSIVE ESOPHAGITIS, SLIDING HIATAL HERNIA, MILD GASTRITIS, DR. CLAUDINE LEWIS AT [facility]    ENDOSCOPY N/A 2021    Procedure: ESOPHAGOGASTRODUODENOSCOPY with cold biopsies;  Surgeon: Cinda Reyes MD;  Location: [facility] ENDOSCOPY;  Service: Gastroenterology;  Laterality: N/A;  pre: reflux, upper abdominal pain, nasuea  post:hiatal hernia, gastritis, duodenitis    OTHER SURGICAL HISTORY N/A 2007    SITZ MARKER STUDY- ALL MARKERS PASSED, KUB AT [facility]    SUBTOTAL HYSTERECTOMY N/A 2010    [facility] D/T ADENOMYOSIS,MENORRHAGIA, DR. VEENA DURANT AT [facility]       Review of Systems   Constitutional: Negative.    Respiratory: Negative.     Cardiovascular: Negative.    Endocrine: Negative.    Neurological: Negative.        Allergies   Allergen Reactions    Codeine Nausea Only    Keflex [Cephalexin] Hives       Objective     Vitals:    24 1307   BP: 110/64   Pulse: 70   Resp: 16   Temp: 98.1 °F (36.7 °C)   SpO2: 99%   Weight: 61.7 kg (136 lb)   Height: 162.6 cm (64.02\")     Body mass index is 23.33 kg/m².    Physical Exam  Vitals reviewed.   HENT:      Head: Normocephalic.   Eyes:      Pupils: Pupils are equal, round, and reactive to light.   Neck:      Thyroid: No thyroid mass, thyromegaly or thyroid tenderness.   Cardiovascular:      Rate and Rhythm: Normal rate and regular rhythm.   Pulmonary:      Effort: Pulmonary effort is normal.      Breath sounds: " Normal breath sounds.   Musculoskeletal:         General: Normal range of motion.   Skin:     General: Skin is warm and dry.   Neurological:      Mental Status: She is alert and oriented to person, place, and time.           Current Outpatient Medications:     eszopiclone (LUNESTA) 3 MG tablet, Take 1 tablet by mouth every night at bedtime., Disp: 30 tablet, Rfl: 5    Progesterone (PROMETRIUM) 100 MG capsule, Take 1 capsule by mouth every night at bedtime., Disp: , Rfl:     valACYclovir (VALTREX) 500 MG tablet, Take 1 tablet by mouth 3 (Three) Times a Day As Needed (cold sore)., Disp: 90 tablet, Rfl: 1    levothyroxine (Synthroid) 100 MCG tablet, Take 1 tablet by mouth Daily., Disp: 30 tablet, Rfl: 0    Recent Results (from the past 2016 hour(s))   TSH    Collection Time: 03/19/24  1:49 PM    Specimen: Blood   Result Value Ref Range    TSH 0.010 (L) 0.450 - 4.500 uIU/mL   T4, Free    Collection Time: 03/19/24  1:49 PM    Specimen: Blood   Result Value Ref Range    Free T4 2.13 (H) 0.82 - 1.77 ng/dL   T3, Free    Collection Time: 03/19/24  1:49 PM    Specimen: Blood   Result Value Ref Range    T3, Free 2.9 2.0 - 4.4 pg/mL   CBC & Differential    Collection Time: 03/19/24  1:49 PM    Specimen: Blood   Result Value Ref Range    WBC 5.5 3.4 - 10.8 x10E3/uL    RBC 4.22 3.77 - 5.28 x10E6/uL    Hemoglobin 13.6 11.1 - 15.9 g/dL    Hematocrit 39.9 34.0 - 46.6 %    MCV 95 79 - 97 fL    MCH 32.2 26.6 - 33.0 pg    MCHC 34.1 31.5 - 35.7 g/dL    RDW 12.1 11.7 - 15.4 %    Platelets 249 150 - 450 x10E3/uL    Neutrophil Rel % 68 Not Estab. %    Lymphocyte Rel % 23 Not Estab. %    Monocyte Rel % 8 Not Estab. %    Eosinophil Rel % 1 Not Estab. %    Basophil Rel % 0 Not Estab. %    Neutrophils Absolute 3.7 1.4 - 7.0 x10E3/uL    Lymphocytes Absolute 1.2 0.7 - 3.1 x10E3/uL    Monocytes Absolute 0.4 0.1 - 0.9 x10E3/uL    Eosinophils Absolute 0.1 0.0 - 0.4 x10E3/uL    Basophils Absolute 0.0 0.0 - 0.2 x10E3/uL    Immature Granulocyte Rel %  0 Not Estab. %    Immature Grans Absolute 0.0 0.0 - 0.1 x10E3/uL   Lipid Panel With LDL / HDL Ratio    Collection Time: 03/19/24  1:49 PM    Specimen: Blood   Result Value Ref Range    Total Cholesterol 197 100 - 199 mg/dL    Triglycerides 155 (H) 0 - 149 mg/dL    HDL Cholesterol 46 >39 mg/dL    VLDL Cholesterol Alberto 28 5 - 40 mg/dL    LDL Chol Calc (NIH) 123 (H) 0 - 99 mg/dL    LDL/HDL RATIO 2.7 0.0 - 3.2 ratio   Comprehensive Metabolic Panel    Collection Time: 03/19/24  1:49 PM    Specimen: Blood   Result Value Ref Range    Glucose 89 70 - 99 mg/dL    BUN 14 6 - 24 mg/dL    Creatinine 0.80 0.57 - 1.00 mg/dL    EGFR Result 86 >59 mL/min/1.73    BUN/Creatinine Ratio 18 9 - 23    Sodium 139 134 - 144 mmol/L    Potassium 4.1 3.5 - 5.2 mmol/L    Chloride 103 96 - 106 mmol/L    Total CO2 21 20 - 29 mmol/L    Calcium 9.2 8.7 - 10.2 mg/dL    Total Protein 6.4 6.0 - 8.5 g/dL    Albumin 4.3 3.8 - 4.9 g/dL    Globulin 2.1 1.5 - 4.5 g/dL    A/G Ratio 2.0 1.2 - 2.2    Total Bilirubin 0.7 0.0 - 1.2 mg/dL    Alkaline Phosphatase 47 44 - 121 IU/L    AST (SGOT) 17 0 - 40 IU/L    ALT (SGPT) 21 0 - 32 IU/L       Mammo Screening Digital Tomosynthesis Bilateral With CAD  Narrative: HISTORY: 58-year-old asymptomatic female     EXAMINATION: Bilateral digital mammography with R2 computer aided  detection and bilateral digital Tomosynthesis     FINDINGS: Bilateral digital CC and MLO mammographic and digital  Tomosynthesis images were obtained. Comparison is made to prior studies  dated 3/23/2022 and 1/28/2021. Scattered fibroglandular densities are  seen throughout both breasts in a pattern which is unchanged. I see no  new or dominant masses, areas of architectural distortion or skin  thickening. There is no evidence for axillary lymphadenopathy or nipple  retraction. Intact bilateral subpectoral saline implants are noted.     Impression: 1. There is no evidence for malignancy or significant change in either  breast. Routine followup  mammography is recommended.     BI-RADS category 1: Negative.        This report was finalized on 12/15/2023 4:08 PM by Dr. Rico Lo M.D on Workstation: [employer]           Diagnoses and all orders for this visit:    1. Acquired hypothyroidism (Primary)  -     TSH  -     T4, Free  -     T3, Free    2. Primary insomnia  -     CBC & Differential  -     Comprehensive Metabolic Panel    3. Hyperlipidemia, unspecified hyperlipidemia type  -     Lipid Panel With LDL / HDL Ratio    4. Post-menopausal  -     Ambulatory Referral to Gynecology  -     CBC & Differential  -     Comprehensive Metabolic Panel          Patient Instructions   Hypothyroidism: ordering labs for further eval. Labs show low TSH and high T4 free; decreasing levothyroxine from 150 to 100 mcg 1 tab daily. Ordering thyroid profile labs for one month.     Insomnia: ana reviewed and approp. CSA signed today. Will get UDS on next office visit. Patient does not need refills today. Will refill when needed. Call pharmacy for request.    Hyperlipidemia: LDL and triglycerides elevated. Recommend Patient to Eat a heart healthy low fat low sugar diet, drink plenty of water with goal 64 oz a day, and exercise 3-5 times a week, for more than 30 minutes at a time; will repeat lipid labs in 6 months.    Post menopausal: referring to GYN    Return for thyroid profile labs in one month; fasting labs in 6 months, Annual physical.    Answers submitted by the patient for this visit:  Other (Submitted on 3/11/2024)  Please describe your symptoms.: check up  Have you had these symptoms before?: No  How long have you been having these symptoms?: Greater than 2 weeks  Please describe any probable cause for these symptoms. : none  Primary Reason for Visit (Submitted on 3/11/2024)  What is the primary reason for your visit?: Other

## 2024-03-19 NOTE — PATIENT INSTRUCTIONS
Hypothyroidism: ordering labs for further eval. Labs show low TSH and high T4 free; decreasing levothyroxine from 150 to 100 mcg 1 tab daily. Ordering thyroid profile labs for one month.     Insomnia: ana reviewed and approp. CSA signed today. Will get UDS on next office visit. Patient does not need refills today. Will refill when needed. Call pharmacy for request.    Hyperlipidemia: LDL and triglycerides elevated. Recommend Patient to Eat a heart healthy low fat low sugar diet, drink plenty of water with goal 64 oz a day, and exercise 3-5 times a week, for more than 30 minutes at a time; will repeat lipid labs in 6 months.    Post menopausal: referring to GYN

## 2024-03-20 DIAGNOSIS — E03.9 ACQUIRED HYPOTHYROIDISM: Primary | ICD-10-CM

## 2024-03-20 DIAGNOSIS — E03.9 ACQUIRED HYPOTHYROIDISM: ICD-10-CM

## 2024-03-20 LAB
ALBUMIN SERPL-MCNC: 4.3 G/DL (ref 3.8–4.9)
ALBUMIN/GLOB SERPL: 2 {RATIO} (ref 1.2–2.2)
ALP SERPL-CCNC: 47 IU/L (ref 44–121)
ALT SERPL-CCNC: 21 IU/L (ref 0–32)
AST SERPL-CCNC: 17 IU/L (ref 0–40)
BASOPHILS # BLD AUTO: 0 X10E3/UL (ref 0–0.2)
BASOPHILS NFR BLD AUTO: 0 %
BILIRUB SERPL-MCNC: 0.7 MG/DL (ref 0–1.2)
BUN SERPL-MCNC: 14 MG/DL (ref 6–24)
BUN/CREAT SERPL: 18 (ref 9–23)
CALCIUM SERPL-MCNC: 9.2 MG/DL (ref 8.7–10.2)
CHLORIDE SERPL-SCNC: 103 MMOL/L (ref 96–106)
CHOLEST SERPL-MCNC: 197 MG/DL (ref 100–199)
CO2 SERPL-SCNC: 21 MMOL/L (ref 20–29)
CREAT SERPL-MCNC: 0.8 MG/DL (ref 0.57–1)
EGFRCR SERPLBLD CKD-EPI 2021: 86 ML/MIN/1.73
EOSINOPHIL # BLD AUTO: 0.1 X10E3/UL (ref 0–0.4)
EOSINOPHIL NFR BLD AUTO: 1 %
ERYTHROCYTE [DISTWIDTH] IN BLOOD BY AUTOMATED COUNT: 12.1 % (ref 11.7–15.4)
GLOBULIN SER CALC-MCNC: 2.1 G/DL (ref 1.5–4.5)
GLUCOSE SERPL-MCNC: 89 MG/DL (ref 70–99)
HCT VFR BLD AUTO: 39.9 % (ref 34–46.6)
HDLC SERPL-MCNC: 46 MG/DL
HGB BLD-MCNC: 13.6 G/DL (ref 11.1–15.9)
IMM GRANULOCYTES # BLD AUTO: 0 X10E3/UL (ref 0–0.1)
IMM GRANULOCYTES NFR BLD AUTO: 0 %
LDLC SERPL CALC-MCNC: 123 MG/DL (ref 0–99)
LDLC/HDLC SERPL: 2.7 RATIO (ref 0–3.2)
LYMPHOCYTES # BLD AUTO: 1.2 X10E3/UL (ref 0.7–3.1)
LYMPHOCYTES NFR BLD AUTO: 23 %
MCH RBC QN AUTO: 32.2 PG (ref 26.6–33)
MCHC RBC AUTO-ENTMCNC: 34.1 G/DL (ref 31.5–35.7)
MCV RBC AUTO: 95 FL (ref 79–97)
MONOCYTES # BLD AUTO: 0.4 X10E3/UL (ref 0.1–0.9)
MONOCYTES NFR BLD AUTO: 8 %
NEUTROPHILS # BLD AUTO: 3.7 X10E3/UL (ref 1.4–7)
NEUTROPHILS NFR BLD AUTO: 68 %
PLATELET # BLD AUTO: 249 X10E3/UL (ref 150–450)
POTASSIUM SERPL-SCNC: 4.1 MMOL/L (ref 3.5–5.2)
PROT SERPL-MCNC: 6.4 G/DL (ref 6–8.5)
RBC # BLD AUTO: 4.22 X10E6/UL (ref 3.77–5.28)
SODIUM SERPL-SCNC: 139 MMOL/L (ref 134–144)
T3FREE SERPL-MCNC: 2.9 PG/ML (ref 2–4.4)
T4 FREE SERPL-MCNC: 2.13 NG/DL (ref 0.82–1.77)
TRIGL SERPL-MCNC: 155 MG/DL (ref 0–149)
TSH SERPL DL<=0.005 MIU/L-ACNC: 0.01 UIU/ML (ref 0.45–4.5)
VLDLC SERPL CALC-MCNC: 28 MG/DL (ref 5–40)
WBC # BLD AUTO: 5.5 X10E3/UL (ref 3.4–10.8)

## 2024-03-20 RX ORDER — LEVOTHYROXINE SODIUM 0.1 MG/1
100 TABLET ORAL DAILY
Qty: 30 TABLET | Refills: 0 | Status: SHIPPED | OUTPATIENT
Start: 2024-03-20

## 2024-03-20 RX ORDER — LEVOTHYROXINE SODIUM 0.1 MG/1
100 TABLET ORAL DAILY
Qty: 90 TABLET | OUTPATIENT
Start: 2024-03-20

## 2024-03-20 NOTE — TELEPHONE ENCOUNTER
Rx Refill Note  Requested Prescriptions     Pending Prescriptions Disp Refills    levothyroxine (SYNTHROID, LEVOTHROID) 100 MCG tablet [Pharmacy Med Name: LEVOTHYROXINE 0.100MG (100MCG) TAB] 90 tablet      Sig: TAKE 1 TABLET BY MOUTH DAILY      Last office visit with prescribing clinician: 3/19/2024   Last telemedicine visit with prescribing clinician: Visit date not found   Next office visit with prescribing clinician: 9/23/2024                         Would you like a call back once the refill request has been completed: [] Yes [] No    If the office needs to give you a call back, can they leave a voicemail: [] Yes [] No    Lindsay Massey MA  03/20/24, 14:54 EDT

## 2024-03-20 NOTE — TELEPHONE ENCOUNTER
----- Message from PRUDENCE Peter sent at 3/20/2024 12:00 PM EDT -----  Please call Patient and inform her that her TSH and T4 abnormal, sent over script for synthroid 100 mcg daily, want her to set up an appt to come back in office for repeat labs in one month. LDL cholesterol elevated and triglycerides slightly elevated Recommend eating a heart low fat and low sugar diet. All other labs normal. Left detail message through my chart.   thanks

## 2024-06-20 DIAGNOSIS — F41.9 ANXIETY: ICD-10-CM

## 2024-06-20 RX ORDER — VALACYCLOVIR HYDROCHLORIDE 500 MG/1
500 TABLET, FILM COATED ORAL 3 TIMES DAILY PRN
Qty: 90 TABLET | Refills: 1 | OUTPATIENT
Start: 2024-06-20

## 2024-06-20 RX ORDER — ESZOPICLONE 3 MG/1
3 TABLET, FILM COATED ORAL
Qty: 30 TABLET | Refills: 5 | OUTPATIENT
Start: 2024-06-20

## 2024-06-20 NOTE — TELEPHONE ENCOUNTER
Caller: Zee Reno    Relationship: Self    Best call back number:    [phone] (Mobile)       Requested Prescriptions:   Requested Prescriptions     Pending Prescriptions Disp Refills    eszopiclone (LUNESTA) 3 MG tablet 30 tablet 5     Sig: Take 1 tablet by mouth every night at bedtime.    valACYclovir (VALTREX) 500 MG tablet 90 tablet 1     Sig: Take 1 tablet by mouth 3 (Three) Times a Day As Needed (cold sore).        Pharmacy where request should be sent: [facility] [address] - [phone]  - [phone] FX     Last office visit with prescribing clinician: 3/19/2024   Last telemedicine visit with prescribing clinician: Visit date not found   Next office visit with prescribing clinician: 2024     Additional details provided by patient: STATES THE PRESCRIPTION SAYS IT IS  IN MY CHART     Does the patient have less than a 3 day supply:  [x] Yes  [] No    Would you like a call back once the refill request has been completed: [x] Yes [] No    If the office needs to give you a call back, can they leave a voicemail: [x] Yes [] No    Serena Rascon   24 08:46 EDT

## 2024-06-21 NOTE — TELEPHONE ENCOUNTER
Caller: Zee Reno    Relationship: Self    Best call back number: [phone]     What is the best time to reach you: ANYTIME    Who are you requesting to speak with (clinical staff, provider,  specific staff member): BREANNE TURNER    What was the call regarding: PATIENT STATED SHE ONLY SAW DR HUTCHISON ONCE OR TWICE A YEAR FOR MEDICATIONS.    PATIENT STATED SHE WAS LAST SEEN BY BREANNE TURNER ON 03- AND DID NOT RECEIVE ANY NOTE IN [employer] INFORMING HER SHE MUST BE SEEN SOONER OR SEEN AGAIN FOR MEDICATION REFILLS.    PATIENT IS REQUESTING TO KNOW IF SHE MUST BE SEEN, AND AT WHAT FREQUENCY.    PATIENT STATED SHE WILL BE OUT OF LUNESTA IN A COUPLE OF DAYS     Is it okay if the provider responds through [employer]: NO    PLEASE CALL TO DISCUSS AND ADVISE

## 2024-12-10 ENCOUNTER — OFFICE VISIT (OUTPATIENT)
Dept: OBSTETRICS AND GYNECOLOGY | Age: 59
End: 2024-12-10
Payer: COMMERCIAL

## 2024-12-10 ENCOUNTER — TELEPHONE (OUTPATIENT)
Dept: OBSTETRICS AND GYNECOLOGY | Age: 59
End: 2024-12-10

## 2024-12-10 VITALS
BODY MASS INDEX: 23.83 KG/M2 | WEIGHT: 139.6 LBS | HEIGHT: 64 IN | DIASTOLIC BLOOD PRESSURE: 60 MMHG | SYSTOLIC BLOOD PRESSURE: 112 MMHG

## 2024-12-10 DIAGNOSIS — Z13.89 SCREENING FOR BLOOD OR PROTEIN IN URINE: Primary | ICD-10-CM

## 2024-12-10 DIAGNOSIS — Z79.890 HORMONE REPLACEMENT THERAPY (HRT): ICD-10-CM

## 2024-12-10 DIAGNOSIS — N89.8 VAGINAL ODOR: ICD-10-CM

## 2024-12-10 DIAGNOSIS — Z20.2 POSSIBLE EXPOSURE TO STD: ICD-10-CM

## 2024-12-10 LAB
BILIRUB BLD-MCNC: NEGATIVE MG/DL
GLUCOSE UR STRIP-MCNC: NEGATIVE MG/DL
KETONES UR QL: NEGATIVE
LEUKOCYTE EST, POC: NEGATIVE
NITRITE UR-MCNC: NEGATIVE MG/ML
PH UR: 6 [PH] (ref 5–8)
PROT UR STRIP-MCNC: NEGATIVE MG/DL
RBC # UR STRIP: NEGATIVE /UL
SP GR UR: 1.02 (ref 1–1.03)
UROBILINOGEN UR QL: NORMAL

## 2024-12-10 RX ORDER — LEVOTHYROXINE, LIOTHYRONINE 38; 9 UG/1; UG/1
120 TABLET ORAL DAILY
COMMUNITY
Start: 2024-11-09

## 2024-12-10 NOTE — PROGRESS NOTES
"Subjective     Chief Complaint   Patient presents with    Gynecologic Exam     Pt c/o vaginal pain. Pt c/o odor and discomfort, increased urination with discomfort.        Zee Reno is a 59 y.o.  whose LMP is No LMP recorded (lmp unknown). Patient has had a hysterectomy. presents with the complaint of a vaginal odor and discomfort.  She has some abdominal discomfort especially after intercourse as well.  She is concerned that she has an infection.  She is on hormone replacement therapy with pellets.      The following portions of the patient's history were reviewed and updated as appropriate:vital signs, allergies, current medications, past medical history, past social history, past surgical history, and problem list      Objective      /60   Ht 162.6 cm (64.02\")   Wt 63.3 kg (139 lb 9.6 oz)   LMP  (LMP Unknown)   BMI 23.95 kg/m²     Physical Exam  Well, no distress  Regular, nonlabored breathing  Mild generalized abdominal tenderness noted  Pelvic exam with normal-appearing vaginal mucosa and discharge.  The cervix is absent.  There is mild adnexal tenderness but no bladder tenderness    Brief Urine Lab Results  (Last result in the past 365 days)        Color   Clarity   Blood   Leuk Est   Nitrite   Protein   CREAT   Urine HCG        12/10/24 1059     Negative   Negative   Negative   Negative                     Assessment & Plan     Diagnoses and all orders for this visit:    1. Screening for blood or protein in urine (Primary)  -     POC Urinalysis Dipstick    2. Possible exposure to STD  -     Ct, Ng, Mycoplasmas LIZ, Swab - Swab, Vagina  -     Trichomonas vaginalis, PCR - Swab, Vagina    3. Vaginal odor  -     Ct, Ng, Mycoplasmas LIZ, Swab - Swab, Vagina  -     Trichomonas vaginalis, PCR - Swab, Vagina    4. Hormone replacement therapy (HRT)      Screening for vaginal infection ordered today  Her urinalysis is normal  She does note significant constipation and possibly this is contributing " to her abdominal discomfort  She is on hormone replacement therapy with pellets and discussed switching to an FDA approved therapy, we will discuss this when she follows up in January      Daysi Michelle MD  12/10/2024

## 2024-12-10 NOTE — TELEPHONE ENCOUNTER
Hub staff attempted to follow warm transfer process and was unsuccessful     Caller: Zee Reno    Relationship to patient: Self    Best call back number: [phone] (home)       Patient is needing: A CALL BACK/APPT. SHE IS HAVING PAIN, WITH ODOR AND DISCHARGE. HAS ALREADY HAD A VIDEO APPT AND FINISHED ABX BUT NOT GETTING BETTER AND WANTED AN APPT WITH ANYONE AT THE [facility]  OFFICE. [facility] HAS NO OPENINGS UNTIL JAN.     PLEASE CALL PT BACK ANYTIME.

## 2024-12-16 LAB
C TRACH DNA SPEC QL NAA+PROBE: NEGATIVE
M GENITALIUM DNA SPEC QL NAA+PROBE: NEGATIVE
M HOMINIS DNA SPEC QL NAA+PROBE: NEGATIVE
N GONORRHOEA DNA VAG QL NAA+PROBE: NEGATIVE
T VAGINALIS RRNA SPEC QL NAA+PROBE: NEGATIVE
UREAPLASMA DNA SPEC QL NAA+PROBE: NEGATIVE

## 2024-12-17 ENCOUNTER — TRANSCRIBE ORDERS (OUTPATIENT)
Dept: ADMINISTRATIVE | Facility: HOSPITAL | Age: 59
End: 2024-12-17
Payer: COMMERCIAL

## 2024-12-17 DIAGNOSIS — Z12.31 VISIT FOR SCREENING MAMMOGRAM: Primary | ICD-10-CM

## 2025-01-28 ENCOUNTER — TELEPHONE (OUTPATIENT)
Dept: OBSTETRICS AND GYNECOLOGY | Age: 60
End: 2025-01-28
Payer: COMMERCIAL

## 2025-01-28 ENCOUNTER — TELEPHONE (OUTPATIENT)
Dept: OBSTETRICS AND GYNECOLOGY | Age: 60
End: 2025-01-28

## 2025-01-28 ENCOUNTER — OFFICE VISIT (OUTPATIENT)
Dept: OBSTETRICS AND GYNECOLOGY | Age: 60
End: 2025-01-28
Payer: COMMERCIAL

## 2025-01-28 VITALS
WEIGHT: 145 LBS | HEIGHT: 64 IN | BODY MASS INDEX: 24.75 KG/M2 | DIASTOLIC BLOOD PRESSURE: 70 MMHG | SYSTOLIC BLOOD PRESSURE: 122 MMHG

## 2025-01-28 DIAGNOSIS — Z79.890 HORMONE REPLACEMENT THERAPY (HRT): ICD-10-CM

## 2025-01-28 DIAGNOSIS — Z78.0 POST-MENOPAUSAL: Primary | ICD-10-CM

## 2025-01-28 RX ORDER — PROGESTERONE 100 MG/1
100 CAPSULE ORAL
Qty: 90 CAPSULE | Refills: 3 | Status: SHIPPED | OUTPATIENT
Start: 2025-01-28

## 2025-01-28 RX ORDER — ESTRADIOL 0.1 MG/D
1 FILM, EXTENDED RELEASE TRANSDERMAL 2 TIMES WEEKLY
Qty: 24 PATCH | Refills: 3 | Status: SHIPPED | OUTPATIENT
Start: 2025-01-30

## 2025-01-28 NOTE — PROGRESS NOTES
"Subjective     Chief Complaint   Patient presents with    Gynecologic Exam     Discuss HRT options        Zee Reno is a 59 y.o.  whose LMP is No LMP recorded (lmp unknown). Patient has had a hysterectomy. presents to discuss HRT. She has been on the patch in the past but most recently on estradiol and testosterone pellets. Was having a/e from these and plans to discontinue. Has been about three months since last.  Does have hot flashes, night sweats, joint pains without HRT    No Additional Complaints Reported    The following portions of the patient's history were reviewed and updated as appropriate:vital signs, allergies, current medications, past medical history, past social history, past surgical history, and problem list    Objective      /70   Ht 162.6 cm (64.02\")   Wt 65.8 kg (145 lb)   LMP  (LMP Unknown)   BMI 24.87 kg/m²     Physical Exam  Well, no distress  Regular, nonlabored breathing      Assessment & Plan     Diagnoses and all orders for this visit:    1. Post-menopausal (Primary)    2. Hormone replacement therapy (HRT)    Other orders  -     estradiol (VIVELLE-DOT) 0.1 MG/24HR patch; Place 1 patch on the skin as directed by provider 2 (Two) Times a Week.  Dispense: 24 patch; Refill: 3  -     Progesterone (PROMETRIUM) 100 MG capsule; Take 1 capsule by mouth every night at bedtime.  Dispense: 90 capsule; Refill: 3      Plan to switch to an FDA approved hormone replacement therapy.  She does have significant menopausal symptoms  Could consider low-dose of topical testosterone in the future but does not need treatment of low libido issues at this time.    Follow up: 3 mo    Daysi Michelle MD  2025             "

## 2025-01-28 NOTE — TELEPHONE ENCOUNTER
EZE MONSON    [phone]    PT IS CALLING BECAUSE SHE WANTS TO CANCEL 4/29 & KEEP 5/15 (ANNUAL) BUT CHANGE LARISA VELASCO TO DR DAVID

## 2025-01-28 NOTE — TELEPHONE ENCOUNTER
PT did not schedule follow up appointment at checkout. PT needing 3 month f/u for HRT per Dr. Michelle. Scheduled 04/29/25 at 9:45. LVMTCB if time/date does not work.

## 2025-04-08 ENCOUNTER — TELEPHONE (OUTPATIENT)
Dept: OBSTETRICS AND GYNECOLOGY | Age: 60
End: 2025-04-08
Payer: COMMERCIAL

## 2025-04-08 RX ORDER — ESTRADIOL 0.1 MG/G
1 CREAM VAGINAL 2 TIMES WEEKLY
Qty: 42.5 G | Refills: 12 | Status: SHIPPED | OUTPATIENT
Start: 2025-04-10

## 2025-04-08 NOTE — TELEPHONE ENCOUNTER
Vaginal dryness    Patient is wanting Rx to pharmacy due to vaginal dryness patient suggest a vaginal estrogen cream. Please advise if appropriate [facility].

## 2025-05-08 ENCOUNTER — HOSPITAL ENCOUNTER (OUTPATIENT)
Dept: MAMMOGRAPHY | Facility: HOSPITAL | Age: 60
Discharge: HOME | End: 2025-05-08
Admitting: OBSTETRICS & GYNECOLOGY
Payer: COMMERCIAL

## 2025-05-08 DIAGNOSIS — Z12.31 VISIT FOR SCREENING MAMMOGRAM: ICD-10-CM

## 2025-05-08 PROCEDURE — 77067 SCR MAMMO BI INCL CAD: CPT

## 2025-05-08 PROCEDURE — 77063 BREAST TOMOSYNTHESIS BI: CPT

## 2025-05-20 ENCOUNTER — OFFICE VISIT (OUTPATIENT)
Dept: OBSTETRICS AND GYNECOLOGY | Age: 60
End: 2025-05-20
Payer: COMMERCIAL

## 2025-05-20 VITALS
BODY MASS INDEX: 22.71 KG/M2 | WEIGHT: 133 LBS | SYSTOLIC BLOOD PRESSURE: 118 MMHG | DIASTOLIC BLOOD PRESSURE: 74 MMHG | HEIGHT: 64 IN

## 2025-05-20 DIAGNOSIS — Z01.419 ENCOUNTER FOR GYNECOLOGICAL EXAMINATION WITHOUT ABNORMAL FINDING: ICD-10-CM

## 2025-05-20 DIAGNOSIS — Z12.11 SCREEN FOR COLON CANCER: Primary | ICD-10-CM

## 2025-05-20 NOTE — PROGRESS NOTES
Routine Annual Visit    2025    Patient: Zee Reno          MR#:[id number]      Chief Complaint   Patient presents with    Gynecologic Exam     AE Today, last pap 3/23/2022 (-), HPV (-), MG 2025, Colonoscopy 2013       History of Present Illness    59 y.o. female  who presents for annual exam. She is doing okay but she has some significant stressors.  Her dad had not been in her life for 10 years but recently showed back up and also has a terminal cancer.  She notes that she has been having some crying episodes.  She does overall feel well on the patch but has just been using half of a patch, overall 0.05 dosage.  She takes the Prometrium at night.        No LMP recorded (lmp unknown). Patient has had a hysterectomy.  Obstetric History:  OB History          2    Para   2    Term   2            AB        Living   2         SAB        IAB        Ectopic        Molar        Multiple        Live Births   2               Menstrual History:     No LMP recorded (lmp unknown). Patient has had a hysterectomy.       ________________________________________  Patient Active Problem List   Diagnosis    Generalized anxiety disorder    Hypothyroidism    Irritable bowel syndrome    Anxiety    Hyperlipidemia    Primary insomnia    Chest pain    Esophageal dysphagia    Bladder irritability    History of tobacco use    Dyspnea    Erosive esophagitis    Hiatal hernia    Palpitations    Post-menopausal    Hormone replacement therapy (HRT)       Past Medical History:   Diagnosis Date    Abdominal pain 2002    NEGATIVE ABDOMINAL SERIES, SEEN AT [facility] ER    Adenomyosis     Adjustment disorder with mixed anxiety and depressed mood     Allergic rhinitis     Anxiety     Breast injury     fell and bruised rt breast mamny years.    Colon polyps     Depression     Dermatitis 12/10/2014    Disease of thyroid gland     HYPOTHYROIDISM    Dyspareunia, female     Erosive esophagitis 2003    DR. DEBBIE PYLE     Eustachian tube disorder     BILATERAL    Gastritis 2003    MILD, DR. LEWIS EGD    Generalized anxiety disorder     Genital herpes simplex     GERD (gastroesophageal reflux disease) 2005    History of menorrhagia     Hyperlipidemia 2017    Irritable bowel syndrome with constipation     Left leg pain 2007    MRI OF LUMBAR SPINE AT [facility] WAS WNL    Melanosis coli 2003    DR. CLAUDINE LEWIS CY    Menopausal state     Ovarian cyst 2002    CT SCAN AT [facility] FOR 3CM LEFT OVARIAN CYST    Right sided abdominal pain 2002    CT SCAN SHOWED TRACE AMT OF FREE FLUID IN RIGHT HEMIPELVIS, UTERUS AND ADNEXA APPEAR UNREMARKABLE, TINY FOLLICLES WITHIN THE RIGHT OVARY    Sliding hiatal hernia 2003     EGD        Family History   Problem Relation Age of Onset    Alzheimer's disease Mother     Hypertension Mother     Hyperlipidemia Mother     No Known Problems Son     No Known Problems Son     Alzheimer's disease Maternal Grandmother     Ovarian cancer Maternal Aunt     Heart disease Maternal Grandfather     Colon polyps Paternal Grandfather     Heart disease Father     Breast cancer Neg Hx        Past Surgical History:   Procedure Laterality Date    AUGMENTATION MAMMAPLASTY      over 20 years ago     SECTION N/A 1999    COLONOSCOPY N/A 2013    HYPERPLASTIC POLYP, RESCOPE 3 YRS. DR.RUSS RECINOS    COLONOSCOPY N/A 2003    MELANOSIS COLI, INTERNAL HEMORRHOIDS, DR. CLAUDINE LEWIS AT [facility]    DIAGNOSTIC LAPAROSCOPY N/A 2002    NORMAL PELVIS, NORMAL APPENDIX, QUESTIONABLE ADENOMYOSIS, DR. VEENA DURANT AT [facility]    ENDOMETRIAL ABLATION N/A 2002    THERMACHOICE, D&C, AND HYSTEROSCOPY, DR. VEENA DURANT AT [facility]    ENDOSCOPY N/A 2003    EROSIVE ESOPHAGITIS, SLIDING HIATAL HERNIA, MILD GASTRITIS, DR. CLAUDINE LEWIS AT [facility]    ENDOSCOPY N/A 2021    Procedure: ESOPHAGOGASTRODUODENOSCOPY with cold biopsies;  Surgeon: Cinda Reyes MD;  Location: [facility] ENDOSCOPY;   "Service: Gastroenterology;  Laterality: N/A;  pre: reflux, upper abdominal pain, nasuea  post:hiatal hernia, gastritis, duodenitis    OTHER SURGICAL HISTORY N/A 11/2007    SITZ MARKER STUDY- ALL MARKERS PASSED, KUB AT [facility]    SUBTOTAL HYSTERECTOMY N/A 02/01/2010    [facility] ADENOM[facility]OSIS,MENORRHAGIA, DR. VEENA DURANT AT [facility]       Social History     Tobacco Use   Smoking Status Never   Smokeless Tobacco Never       has a current medication list which includes the following prescription(s): estradiol, estradiol, eszopiclone, levothyroxine, progesterone, valacyclovir, and np thyroid.  ________________________________________        Objective   Physical Exam    /74   Ht 162.6 cm (64.02\")   Wt 60.3 kg (133 lb)   LMP  (LMP Unknown)   BMI 22.82 kg/m²    BP Readings from Last 3 Encounters:   05/20/25 118/74   01/28/25 122/70   12/10/24 112/60      Wt Readings from Last 3 Encounters:   05/20/25 60.3 kg (133 lb)   01/28/25 65.8 kg (145 lb)   12/10/24 63.3 kg (139 lb 9.6 oz)         BMI: Body mass index is 22.82 kg/m².       General:   alert, appears stated age, and cooperative   Neck: No thyromegaly or LAD   Abdomen: soft, non-tender, without masses or organomegaly   Breast: inspection negative, no nipple discharge or bleeding, no masses or nodularity palpable   Urethra and bladder: urethral meatus normal; bladder nontender to palpation;   Vulva: normal, Bartholin's, Urethra, Alexis's normal   Vagina: normal mucosa, normal discharge   Cervix: absent   Uterus: absent    Adnexa: normal adnexa and no mass, fullness, tenderness       Assessment:    normal annual exam   Menopause  HRT    Plan:    Plan     []  Mammogram request made  [x]  PAP done  []  Labs:   []  GC/Chl/TV  []  DEXA scan   []  Referral for colonoscopy:     She plans to increase to the 0.1 estradiol dosage.  She does not have a uterus but Prometrium has been helping with sleep.  Overall doing well transitioning from pellets to FDA approved HRT.  I " encouraged some self-care during this stressful time with her family.    Counseling  [x] Menopause  [x]  Nutrition  [x]  Physical activity/regular exercise   []  Healthy weight  []  Injury prevention  []  Smoking cessation  []  Substance misuse/abuse  []  Sexual behavior  []  STD prevention  []  Contraception  []  Dental health  [x]  Mental health  []  Immunization  [x]  Encouraged VINICIO Michelle MD  05/20/2025  13:57 EDT

## 2025-05-22 LAB
CYTOLOGIST CVX/VAG CYTO: NORMAL
CYTOLOGY CVX/VAG DOC CYTO: NORMAL
CYTOLOGY CVX/VAG DOC THIN PREP: NORMAL
DX ICD CODE: NORMAL
HPV I/H RISK 4 DNA CVX QL PROBE+SIG AMP: NEGATIVE
OTHER STN SPEC: NORMAL
SERVICE CMNT-IMP: NORMAL
STAT OF ADQ CVX/VAG CYTO-IMP: NORMAL

## (undated) DEVICE — TUBING, SUCTION, 1/4" X 10', STRAIGHT: Brand: MEDLINE

## (undated) DEVICE — MSK ENDO PORT O2 POM ELITE CURAPLEX A/

## (undated) DEVICE — BITEBLOCK OMNI BLOC

## (undated) DEVICE — SENSR O2 OXIMAX FNGR A/ 18IN NONSTR

## (undated) DEVICE — KT ORCA ORCAPOD DISP STRL

## (undated) DEVICE — ADAPT CLN BIOGUARD AIR/H2O DISP

## (undated) DEVICE — LN SMPL CO2 SHTRM SD STREAM W/M LUER

## (undated) DEVICE — FRCP BX RADJAW4 NDL 2.8 240CM LG OG BX40